# Patient Record
Sex: FEMALE | Race: BLACK OR AFRICAN AMERICAN | NOT HISPANIC OR LATINO | Employment: OTHER | URBAN - METROPOLITAN AREA
[De-identification: names, ages, dates, MRNs, and addresses within clinical notes are randomized per-mention and may not be internally consistent; named-entity substitution may affect disease eponyms.]

---

## 2017-02-08 ENCOUNTER — LAB VISIT (OUTPATIENT)
Dept: LAB | Facility: HOSPITAL | Age: 77
End: 2017-02-08
Attending: INTERNAL MEDICINE
Payer: COMMERCIAL

## 2017-02-08 DIAGNOSIS — N39.0 URINARY TRACT INFECTION, SITE NOT SPECIFIED: Primary | ICD-10-CM

## 2017-02-08 LAB
ALBUMIN SERPL BCP-MCNC: 2.7 G/DL
ALP SERPL-CCNC: 90 U/L
ALT SERPL W/O P-5'-P-CCNC: 11 U/L
ANION GAP SERPL CALC-SCNC: 13 MMOL/L
AST SERPL-CCNC: 14 U/L
BACTERIA #/AREA URNS AUTO: ABNORMAL /HPF
BASOPHILS # BLD AUTO: 0.01 K/UL
BASOPHILS NFR BLD: 0.1 %
BILIRUB SERPL-MCNC: 0.4 MG/DL
BILIRUB UR QL STRIP: NEGATIVE
BUN SERPL-MCNC: 68 MG/DL
CALCIUM SERPL-MCNC: 8.7 MG/DL
CHLORIDE SERPL-SCNC: 104 MMOL/L
CLARITY UR REFRACT.AUTO: ABNORMAL
CO2 SERPL-SCNC: 17 MMOL/L
COLOR UR AUTO: YELLOW
CREAT SERPL-MCNC: 3.9 MG/DL
DIFFERENTIAL METHOD: ABNORMAL
EOSINOPHIL # BLD AUTO: 0.1 K/UL
EOSINOPHIL NFR BLD: 0.8 %
ERYTHROCYTE [DISTWIDTH] IN BLOOD BY AUTOMATED COUNT: 16.9 %
EST. GFR  (AFRICAN AMERICAN): 12.1 ML/MIN/1.73 M^2
EST. GFR  (NON AFRICAN AMERICAN): 10.5 ML/MIN/1.73 M^2
GLUCOSE SERPL-MCNC: 146 MG/DL
GLUCOSE UR QL STRIP: NEGATIVE
HCT VFR BLD AUTO: 23 %
HGB BLD-MCNC: 7.3 G/DL
HGB UR QL STRIP: ABNORMAL
HYALINE CASTS UR QL AUTO: 0 /LPF
INR PPP: 1.4
KETONES UR QL STRIP: NEGATIVE
LEUKOCYTE ESTERASE UR QL STRIP: ABNORMAL
LYMPHOCYTES # BLD AUTO: 1 K/UL
LYMPHOCYTES NFR BLD: 9.8 %
MCH RBC QN AUTO: 25.8 PG
MCHC RBC AUTO-ENTMCNC: 31.7 %
MCV RBC AUTO: 81 FL
MICROSCOPIC COMMENT: ABNORMAL
MONOCYTES # BLD AUTO: 1.5 K/UL
MONOCYTES NFR BLD: 14.5 %
NEUTROPHILS # BLD AUTO: 7.5 K/UL
NEUTROPHILS NFR BLD: 74.1 %
NITRITE UR QL STRIP: NEGATIVE
PH UR STRIP: 6 [PH] (ref 5–8)
PLATELET # BLD AUTO: 312 K/UL
PMV BLD AUTO: 10.2 FL
POTASSIUM SERPL-SCNC: 4.1 MMOL/L
PROT SERPL-MCNC: 6.3 G/DL
PROT UR QL STRIP: ABNORMAL
PROTHROMBIN TIME: 14.3 SEC
RBC # BLD AUTO: 2.83 M/UL
RBC #/AREA URNS AUTO: 3 /HPF (ref 0–4)
SODIUM SERPL-SCNC: 134 MMOL/L
SP GR UR STRIP: 1.01 (ref 1–1.03)
SQUAMOUS #/AREA URNS AUTO: 0 /HPF
URN SPEC COLLECT METH UR: ABNORMAL
UROBILINOGEN UR STRIP-ACNC: NEGATIVE EU/DL
WBC # BLD AUTO: 10.11 K/UL
WBC #/AREA URNS AUTO: >100 /HPF (ref 0–5)

## 2017-02-08 PROCEDURE — 85610 PROTHROMBIN TIME: CPT | Mod: PO

## 2017-02-08 PROCEDURE — 87088 URINE BACTERIA CULTURE: CPT

## 2017-02-08 PROCEDURE — 87077 CULTURE AEROBIC IDENTIFY: CPT

## 2017-02-08 PROCEDURE — 81000 URINALYSIS NONAUTO W/SCOPE: CPT | Mod: PO

## 2017-02-08 PROCEDURE — 87186 SC STD MICRODIL/AGAR DIL: CPT

## 2017-02-08 PROCEDURE — 85025 COMPLETE CBC W/AUTO DIFF WBC: CPT | Mod: PO

## 2017-02-08 PROCEDURE — 80053 COMPREHEN METABOLIC PANEL: CPT | Mod: PO

## 2017-02-08 PROCEDURE — 87086 URINE CULTURE/COLONY COUNT: CPT

## 2017-02-11 LAB — BACTERIA UR CULT: NORMAL

## 2017-03-20 ENCOUNTER — LAB VISIT (OUTPATIENT)
Dept: LAB | Facility: HOSPITAL | Age: 77
End: 2017-03-20
Attending: INTERNAL MEDICINE
Payer: COMMERCIAL

## 2017-03-20 DIAGNOSIS — Z79.01 LONG TERM (CURRENT) USE OF ANTICOAGULANTS: Primary | ICD-10-CM

## 2017-03-20 LAB
INR PPP: 6.7
PROTHROMBIN TIME: 65.1 SEC

## 2017-03-20 PROCEDURE — 85610 PROTHROMBIN TIME: CPT | Mod: PO

## 2017-05-16 ENCOUNTER — LAB VISIT (OUTPATIENT)
Dept: LAB | Facility: HOSPITAL | Age: 77
End: 2017-05-16
Attending: INTERNAL MEDICINE
Payer: COMMERCIAL

## 2017-05-16 DIAGNOSIS — N18.30 CHRONIC KIDNEY DISEASE, STAGE III (MODERATE): ICD-10-CM

## 2017-05-16 DIAGNOSIS — I50.21 ACUTE SYSTOLIC HEART FAILURE: Primary | ICD-10-CM

## 2017-05-16 LAB
ALBUMIN SERPL BCP-MCNC: 3.8 G/DL
ALP SERPL-CCNC: 101 U/L
ALT SERPL W/O P-5'-P-CCNC: 23 U/L
ANION GAP SERPL CALC-SCNC: 8 MMOL/L
ANISOCYTOSIS BLD QL SMEAR: SLIGHT
AST SERPL-CCNC: 16 U/L
BASOPHILS # BLD AUTO: 0.02 K/UL
BASOPHILS NFR BLD: 0.3 %
BILIRUB SERPL-MCNC: 0.4 MG/DL
BUN SERPL-MCNC: 25 MG/DL
CALCIUM SERPL-MCNC: 10.3 MG/DL
CHLORIDE SERPL-SCNC: 113 MMOL/L
CO2 SERPL-SCNC: 21 MMOL/L
CREAT SERPL-MCNC: 1.7 MG/DL
DACRYOCYTES BLD QL SMEAR: ABNORMAL
DIFFERENTIAL METHOD: ABNORMAL
EOSINOPHIL # BLD AUTO: 0.1 K/UL
EOSINOPHIL NFR BLD: 1.4 %
ERYTHROCYTE [DISTWIDTH] IN BLOOD BY AUTOMATED COUNT: 18.3 %
EST. GFR  (AFRICAN AMERICAN): 33.1 ML/MIN/1.73 M^2
EST. GFR  (NON AFRICAN AMERICAN): 28.7 ML/MIN/1.73 M^2
GLUCOSE SERPL-MCNC: 97 MG/DL
HCT VFR BLD AUTO: 30.3 %
HGB BLD-MCNC: 9.1 G/DL
LYMPHOCYTES # BLD AUTO: 1.5 K/UL
LYMPHOCYTES NFR BLD: 23.4 %
MCH RBC QN AUTO: 25 PG
MCHC RBC AUTO-ENTMCNC: 30 %
MCV RBC AUTO: 83 FL
MONOCYTES # BLD AUTO: 0.6 K/UL
MONOCYTES NFR BLD: 8.6 %
NEUTROPHILS # BLD AUTO: 4.3 K/UL
NEUTROPHILS NFR BLD: 66.3 %
OVALOCYTES BLD QL SMEAR: ABNORMAL
PLATELET # BLD AUTO: 350 K/UL
PMV BLD AUTO: 10.3 FL
POIKILOCYTOSIS BLD QL SMEAR: ABNORMAL
POLYCHROMASIA BLD QL SMEAR: ABNORMAL
POTASSIUM SERPL-SCNC: 4.7 MMOL/L
PROT SERPL-MCNC: 7.1 G/DL
RBC # BLD AUTO: 3.64 M/UL
SCHISTOCYTES BLD QL SMEAR: PRESENT
SODIUM SERPL-SCNC: 142 MMOL/L
SPHEROCYTES BLD QL SMEAR: ABNORMAL
WBC # BLD AUTO: 6.54 K/UL

## 2017-05-16 PROCEDURE — 80053 COMPREHEN METABOLIC PANEL: CPT | Mod: PO

## 2017-05-16 PROCEDURE — 85025 COMPLETE CBC W/AUTO DIFF WBC: CPT | Mod: PO

## 2017-06-21 ENCOUNTER — HOSPITAL ENCOUNTER (EMERGENCY)
Facility: HOSPITAL | Age: 77
Discharge: HOME | End: 2017-06-21
Attending: EMERGENCY MEDICINE
Payer: COMMERCIAL

## 2017-06-21 VITALS
RESPIRATION RATE: 18 BRPM | DIASTOLIC BLOOD PRESSURE: 49 MMHG | SYSTOLIC BLOOD PRESSURE: 111 MMHG | HEART RATE: 53 BPM | BODY MASS INDEX: 18.02 KG/M2 | TEMPERATURE: 98 F | WEIGHT: 105 LBS | OXYGEN SATURATION: 100 %

## 2017-06-21 DIAGNOSIS — N39.0 URINARY TRACT INFECTION WITHOUT HEMATURIA, SITE UNSPECIFIED: ICD-10-CM

## 2017-06-21 DIAGNOSIS — R53.1 WEAKNESS: Primary | ICD-10-CM

## 2017-06-21 LAB
ALBUMIN SERPL BCP-MCNC: 3.6 G/DL
ALP SERPL-CCNC: 73 U/L
ALT SERPL W/O P-5'-P-CCNC: 16 U/L
ANION GAP SERPL CALC-SCNC: 11 MMOL/L
AST SERPL-CCNC: 17 U/L
BACTERIA #/AREA URNS AUTO: ABNORMAL /HPF
BASOPHILS # BLD AUTO: 0.02 K/UL
BASOPHILS NFR BLD: 0.3 %
BILIRUB SERPL-MCNC: 0.3 MG/DL
BILIRUB UR QL STRIP: NEGATIVE
BNP SERPL-MCNC: 852 PG/ML
BUN SERPL-MCNC: 33 MG/DL
CALCIUM SERPL-MCNC: 10.3 MG/DL
CHLORIDE SERPL-SCNC: 109 MMOL/L
CK SERPL-CCNC: 24 U/L
CLARITY UR REFRACT.AUTO: ABNORMAL
CO2 SERPL-SCNC: 18 MMOL/L
COLOR UR AUTO: YELLOW
CREAT SERPL-MCNC: 2.3 MG/DL
DIFFERENTIAL METHOD: ABNORMAL
EOSINOPHIL # BLD AUTO: 0.1 K/UL
EOSINOPHIL NFR BLD: 1.2 %
ERYTHROCYTE [DISTWIDTH] IN BLOOD BY AUTOMATED COUNT: 17.4 %
EST. GFR  (AFRICAN AMERICAN): 22.9 ML/MIN/1.73 M^2
EST. GFR  (NON AFRICAN AMERICAN): 19.9 ML/MIN/1.73 M^2
GLUCOSE SERPL-MCNC: 125 MG/DL
GLUCOSE UR QL STRIP: NEGATIVE
HCT VFR BLD AUTO: 31.3 %
HGB BLD-MCNC: 9.5 G/DL
HGB UR QL STRIP: ABNORMAL
KETONES UR QL STRIP: NEGATIVE
LEUKOCYTE ESTERASE UR QL STRIP: ABNORMAL
LYMPHOCYTES # BLD AUTO: 1.6 K/UL
LYMPHOCYTES NFR BLD: 23.8 %
MCH RBC QN AUTO: 25.6 PG
MCHC RBC AUTO-ENTMCNC: 30.4 %
MCV RBC AUTO: 84 FL
MICROSCOPIC COMMENT: ABNORMAL
MONOCYTES # BLD AUTO: 0.7 K/UL
MONOCYTES NFR BLD: 10.4 %
NEUTROPHILS # BLD AUTO: 4.2 K/UL
NEUTROPHILS NFR BLD: 63.4 %
NITRITE UR QL STRIP: NEGATIVE
PH UR STRIP: 5 [PH] (ref 5–8)
PLATELET # BLD AUTO: 378 K/UL
PMV BLD AUTO: 10.1 FL
POTASSIUM SERPL-SCNC: 4.8 MMOL/L
PROT SERPL-MCNC: 7.2 G/DL
PROT UR QL STRIP: ABNORMAL
RBC # BLD AUTO: 3.71 M/UL
RBC #/AREA URNS AUTO: 3 /HPF (ref 0–4)
SODIUM SERPL-SCNC: 138 MMOL/L
SP GR UR STRIP: 1.02 (ref 1–1.03)
SQUAMOUS #/AREA URNS AUTO: 10 /HPF
TROPONIN I SERPL DL<=0.01 NG/ML-MCNC: 0.03 NG/ML
URN SPEC COLLECT METH UR: ABNORMAL
UROBILINOGEN UR STRIP-ACNC: NEGATIVE EU/DL
WBC # BLD AUTO: 6.65 K/UL
WBC #/AREA URNS AUTO: >100 /HPF (ref 0–5)
WBC CLUMPS UR QL AUTO: ABNORMAL

## 2017-06-21 PROCEDURE — 82550 ASSAY OF CK (CPK): CPT

## 2017-06-21 PROCEDURE — 81000 URINALYSIS NONAUTO W/SCOPE: CPT

## 2017-06-21 PROCEDURE — 93005 ELECTROCARDIOGRAM TRACING: CPT | Performed by: GENERAL PRACTICE

## 2017-06-21 PROCEDURE — 85025 COMPLETE CBC W/AUTO DIFF WBC: CPT

## 2017-06-21 PROCEDURE — 36000 PLACE NEEDLE IN VEIN: CPT

## 2017-06-21 PROCEDURE — 93010 ELECTROCARDIOGRAM REPORT: CPT | Mod: S$GLB,,, | Performed by: INTERNAL MEDICINE

## 2017-06-21 PROCEDURE — 83880 ASSAY OF NATRIURETIC PEPTIDE: CPT

## 2017-06-21 PROCEDURE — 84484 ASSAY OF TROPONIN QUANT: CPT

## 2017-06-21 PROCEDURE — 99900035 HC TECH TIME PER 15 MIN (STAT): Performed by: GENERAL PRACTICE

## 2017-06-21 PROCEDURE — 99284 EMERGENCY DEPT VISIT MOD MDM: CPT | Mod: 25

## 2017-06-21 PROCEDURE — 80053 COMPREHEN METABOLIC PANEL: CPT

## 2017-06-21 RX ORDER — CEFUROXIME AXETIL 500 MG/1
500 TABLET ORAL 2 TIMES DAILY
Qty: 20 TABLET | Refills: 0 | Status: SHIPPED | OUTPATIENT
Start: 2017-06-21 | End: 2017-07-01

## 2017-06-21 NOTE — ED NOTES
Pt c/o generalized weakness and difficulty walking since yesterday with lack of appetite and weightloss since May. Pt unsure of how much. Reports needing assistance with ambulation today and yesterday.      Level of Consciousness: Patient is awake, alert, oriented to person, place, time, and situation.    Appearance: Pt resting comfortably in stretcher, no acute distress at this time. Pt in hospital gown. Hygiene is appropriate.   Skin: Skin is warm, dry, and intact. Skin turgor is normal/elastic. Mucous membranes moist. Skin color is normal for ethnicity. No skin breakdown noted.  Musculoskeletal: Moves all extremities well. Full active ROM. No deformities noted. Generalized weakness.  Gait unobserved, pt arrived to exam room in wheelchair. Reports difficulty with ambulation and need for assistance at home.    Respiratory: Airway open and patent. Respirations equal and unlabored. Breath sounds clear to auscultation. Denies any SOB.   Cardiac: Bradycardia. No peripheral edema noted. Radial and pedal pulses present and normal. Capillary refill is within normal limits. Denies chest pain. Hx of AV shunt to bilateral upper arms, no bruit or thrill present in either.   GI: Abdomen soft, non-tender to all quadrants with palpitation. Bowel sounds present and active in all quads. Abdomen symmetric with no distention noted. Denies any N/V/D at this time. Pt reports nausea over the past 3 days, but none at this time. Reports blood in stool yesterday, no BM today.    Neurological: Symmetrical expressions noted to face. Equal bilateral . Normal sensation reported to all extremities. No obvious neurological deficits noted.   Psychosocial: Speech spontaneous, clear, and coherent. Appropriate to situation. Pt is calm and cooperative.     Pt informed of plan of care, verbalizes understanding, and denies any other questions, complaints, or concerns at this time. Bed in locked in lowest position, siderails up x2, call light  within reach. Pt continued on cardiac monitor, blood pressure cuff and continuous pulse ox in place. Will continue to monitor.

## 2017-06-21 NOTE — ED PROVIDER NOTES
"Encounter Date: 6/21/2017       History     Chief Complaint   Patient presents with    Palpitations     "heart racing since morning", weakness     Patient complaining of generalized weakness since yesterday.  Whenever she tries to walk she gets weak.  Denies dizziness, chest pain or shortness of breath.  Just generalized weakness.      The history is provided by the patient.   General Illness    The current episode started yesterday. The problem has been unchanged. Nothing relieves the symptoms. The symptoms are aggravated by activity. Pertinent negatives include no fever, no abdominal pain, no constipation, no diarrhea, no nausea, no vomiting, no congestion, no ear pain, no mouth sores, no sore throat, no swollen glands, no muscle aches, no cough, no shortness of breath and no rash. She has received no recent medical care.     Review of patient's allergies indicates:  No Known Allergies  Past Medical History:   Diagnosis Date    Diabetes mellitus     GERD (gastroesophageal reflux disease)     H/O kidney transplant     Hypertension     Renal disorder      Past Surgical History:   Procedure Laterality Date    DIALYSIS FISTULA CREATION Bilateral     KIDNEY TRANSPLANT Right      No family history on file.  Social History   Substance Use Topics    Smoking status: Never Smoker    Smokeless tobacco: Not on file    Alcohol use No     Review of Systems   Constitutional: Negative for fever.   HENT: Negative for congestion, ear pain, mouth sores and sore throat.    Respiratory: Negative for cough and shortness of breath.    Cardiovascular: Negative for chest pain.   Gastrointestinal: Negative for abdominal pain, constipation, diarrhea, nausea and vomiting.   Genitourinary: Negative for dysuria.   Musculoskeletal: Negative for back pain.   Skin: Negative for rash.   Neurological: Negative for weakness.   Hematological: Does not bruise/bleed easily.       Physical Exam     Initial Vitals [06/21/17 1536]   BP Pulse " Resp Temp SpO2   (!) 178/72 (!) 55 16 97.9 °F (36.6 °C) 100 %     Physical Exam    Nursing note and vitals reviewed.  Constitutional: She appears well-developed and well-nourished. No distress.   HENT:   Head: Normocephalic and atraumatic.   Mouth/Throat: Oropharynx is clear and moist.   Eyes: Conjunctivae and EOM are normal. Pupils are equal, round, and reactive to light.   Neck: Normal range of motion. Neck supple.   Cardiovascular: Regular rhythm and normal heart sounds. Bradycardia present.  Exam reveals no gallop and no friction rub.    No murmur heard.  Shunt to RUE with faint thrill   Pulmonary/Chest: Breath sounds normal. No respiratory distress. She has no wheezes. She has no rhonchi. She has no rales.   Abdominal: Soft. Bowel sounds are normal. She exhibits no distension and no mass. There is no tenderness. There is no rebound and no guarding.   Musculoskeletal: Normal range of motion. She exhibits no edema or tenderness.   Neurological: She is alert and oriented to person, place, and time. She has normal strength.   Skin: Skin is warm and dry. No rash noted.   Psychiatric: She has a normal mood and affect. Thought content normal.         ED Course   Procedures  Labs Reviewed   CBC W/ AUTO DIFFERENTIAL - Abnormal; Notable for the following:        Result Value    RBC 3.71 (*)     Hemoglobin 9.5 (*)     Hematocrit 31.3 (*)     MCH 25.6 (*)     MCHC 30.4 (*)     RDW 17.4 (*)     Platelets 378 (*)     All other components within normal limits   COMPREHENSIVE METABOLIC PANEL - Abnormal; Notable for the following:     CO2 18 (*)     Glucose 125 (*)     BUN, Bld 33 (*)     Creatinine 2.3 (*)     eGFR if  22.9 (*)     eGFR if non  19.9 (*)     All other components within normal limits   URINALYSIS - Abnormal; Notable for the following:     Appearance, UA Hazy (*)     Protein, UA Trace (*)     Occult Blood UA 1+ (*)     Leukocytes, UA 3+ (*)     All other components within normal  limits   B-TYPE NATRIURETIC PEPTIDE - Abnormal; Notable for the following:      (*)     All other components within normal limits   TROPONIN I - Abnormal; Notable for the following:     Troponin I 0.030 (*)     All other components within normal limits   URINALYSIS MICROSCOPIC - Abnormal; Notable for the following:     WBC, UA >100 (*)     WBC Clumps, UA Occasional (*)     Bacteria, UA Many (*)     All other components within normal limits   CK     EKG Readings: (Independently Interpreted)   Rhythm: Normal Sinus Rhythm. Heart Rate: 56. Ectopy: No Ectopy. Conduction: Normal. ST Segments: Normal ST Segments. T Waves: Normal. Clinical Impression: Normal Sinus Rhythm     Attending:   Physician Attestation Statement: I have reviewed this case with my non-physician provider.    ED Vital Signs:  Vitals:    06/21/17 1536 06/21/17 1545 06/21/17 1618 06/21/17 1619   BP: (!) 178/72  (!) 143/65 (!) 132/59   Pulse: (!) 55 (!) 55 (!) 53 (!) 54   Resp: 16      Temp: 97.9 °F (36.6 °C)      TempSrc: Oral      SpO2: 100%  100% 100%   Weight: 47.6 kg (105 lb)       06/21/17 1621   BP: (!) 110/48   Pulse: (!) 55   Resp:    Temp:    TempSrc:    SpO2: 100%   Weight:          Abnormal Lab Results:  Labs Reviewed   CBC W/ AUTO DIFFERENTIAL - Abnormal; Notable for the following:        Result Value    RBC 3.71 (*)     Hemoglobin 9.5 (*)     Hematocrit 31.3 (*)     MCH 25.6 (*)     MCHC 30.4 (*)     RDW 17.4 (*)     Platelets 378 (*)     All other components within normal limits   COMPREHENSIVE METABOLIC PANEL - Abnormal; Notable for the following:     CO2 18 (*)     Glucose 125 (*)     BUN, Bld 33 (*)     Creatinine 2.3 (*)     eGFR if  22.9 (*)     eGFR if non  19.9 (*)     All other components within normal limits   URINALYSIS - Abnormal; Notable for the following:     Appearance, UA Hazy (*)     Protein, UA Trace (*)     Occult Blood UA 1+ (*)     Leukocytes, UA 3+ (*)     All other components  within normal limits   B-TYPE NATRIURETIC PEPTIDE - Abnormal; Notable for the following:      (*)     All other components within normal limits   TROPONIN I - Abnormal; Notable for the following:     Troponin I 0.030 (*)     All other components within normal limits   URINALYSIS MICROSCOPIC - Abnormal; Notable for the following:     WBC, UA >100 (*)     WBC Clumps, UA Occasional (*)     Bacteria, UA Many (*)     All other components within normal limits   CK          All Lab Results:  Results for orders placed or performed during the hospital encounter of 06/21/17   CBC auto differential   Result Value Ref Range    WBC 6.65 3.90 - 12.70 K/uL    RBC 3.71 (L) 4.00 - 5.40 M/uL    Hemoglobin 9.5 (L) 12.0 - 16.0 g/dL    Hematocrit 31.3 (L) 37.0 - 48.5 %    MCV 84 82 - 98 fL    MCH 25.6 (L) 27.0 - 31.0 pg    MCHC 30.4 (L) 32.0 - 36.0 %    RDW 17.4 (H) 11.5 - 14.5 %    Platelets 378 (H) 150 - 350 K/uL    MPV 10.1 9.2 - 12.9 fL    Gran # 4.2 1.8 - 7.7 K/uL    Lymph # 1.6 1.0 - 4.8 K/uL    Mono # 0.7 0.3 - 1.0 K/uL    Eos # 0.1 0.0 - 0.5 K/uL    Baso # 0.02 0.00 - 0.20 K/uL    Gran% 63.4 38.0 - 73.0 %    Lymph% 23.8 18.0 - 48.0 %    Mono% 10.4 4.0 - 15.0 %    Eosinophil% 1.2 0.0 - 8.0 %    Basophil% 0.3 0.0 - 1.9 %    Differential Method Automated    Comprehensive metabolic panel   Result Value Ref Range    Sodium 138 136 - 145 mmol/L    Potassium 4.8 3.5 - 5.1 mmol/L    Chloride 109 95 - 110 mmol/L    CO2 18 (L) 23 - 29 mmol/L    Glucose 125 (H) 70 - 110 mg/dL    BUN, Bld 33 (H) 8 - 23 mg/dL    Creatinine 2.3 (H) 0.5 - 1.4 mg/dL    Calcium 10.3 8.7 - 10.5 mg/dL    Total Protein 7.2 6.0 - 8.4 g/dL    Albumin 3.6 3.5 - 5.2 g/dL    Total Bilirubin 0.3 0.1 - 1.0 mg/dL    Alkaline Phosphatase 73 55 - 135 U/L    AST 17 10 - 40 U/L    ALT 16 10 - 44 U/L    Anion Gap 11 8 - 16 mmol/L    eGFR if African American 22.9 (A) >60 mL/min/1.73 m^2    eGFR if non African American 19.9 (A) >60 mL/min/1.73 m^2   Urinalysis   Result  Value Ref Range    Specimen UA Urine, Clean Catch     Color, UA Yellow Yellow, Straw, Cornelia    Appearance, UA Hazy (A) Clear    pH, UA 5.0 5.0 - 8.0    Specific Gravity, UA 1.020 1.005 - 1.030    Protein, UA Trace (A) Negative    Glucose, UA Negative Negative    Ketones, UA Negative Negative    Bilirubin (UA) Negative Negative    Occult Blood UA 1+ (A) Negative    Nitrite, UA Negative Negative    Urobilinogen, UA Negative <2.0 EU/dL    Leukocytes, UA 3+ (A) Negative   Brain natriuretic peptide   Result Value Ref Range     (H) 0 - 99 pg/mL   CK   Result Value Ref Range    CPK 24 20 - 180 U/L   Troponin I   Result Value Ref Range    Troponin I 0.030 (H) 0.000 - 0.026 ng/mL   Urinalysis Microscopic   Result Value Ref Range    RBC, UA 3 0 - 4 /hpf    WBC, UA >100 (H) 0 - 5 /hpf    WBC Clumps, UA Occasional (A) None-Rare    Bacteria, UA Many (A) None-Occ /hpf    Squam Epithel, UA 10 /hpf    Microscopic Comment SEE COMMENT            Imaging Results:  Imaging Results          X-Ray Chest AP Portable (Final result)  Result time 06/21/17 16:15:43    Final result by Carolyn Garcia III, MD (06/21/17 16:15:43)                 Impression:     Stable cardiomegaly without evidence of pulmonary edema. Band-shaped opacity projecting over the retrocardiac left lower lobe possibly representing summation artifact or infiltrate/atelectasis.         Electronically signed by: CAROLYN GARCIA MD  Date:     06/21/17  Time:    16:15              Narrative:    XR CHEST AP PORTABLE    Clinical history: weakness.      Findings: There is stable cardiomegaly and aortic atherosclerosis. The left lower lobe is poorly evaluated due to overlying cardiomegaly and suboptimal penetration.  There is a band-shaped opacity projecting over the retrocardiac left lower lobe possibly representing summation artifact or infiltrate/atelectasis.  The remainder of the lungs appear grossly clear. There is no evidence of pulmonary edema, pleural effusion, or  pneumothorax.                                 The Emergency Provider reviewed the vital signs and test results, which are outlined above.    ED Discussions:  5:08 PM: Reassessed pt at this time.  Pt states her condition has improved at this time.  Patient denies any weakness or dizziness during orthostatics, states she feels much better, and is ready to go home.  Discussed all blood work line by line, and went over how it is mostly improved over patient's baseline.  Patient states she will follow up with her PCP tomorrow.  Discussed with pt all pertinent ED information and results. Discussed pt dx of weakness and UTI and plan of tx. Gave pt all f/u and return to the ED instructions. All questions and concerns were addressed at this time. Pt expresses understanding of information and instructions, and is comfortable with plan to discharge. Pt is stable for discharge.                                 ED Course     Clinical Impression:       ICD-10-CM ICD-9-CM   1. Weakness R53.1 780.79   2. Urinary tract infection without hematuria, site unspecified N39.0 599.0                                Bebeto Zepeda MD  06/21/17 4824

## 2017-06-23 ENCOUNTER — LAB VISIT (OUTPATIENT)
Dept: LAB | Facility: HOSPITAL | Age: 77
End: 2017-06-23
Attending: INTERNAL MEDICINE
Payer: COMMERCIAL

## 2017-06-23 DIAGNOSIS — I13.0 HYPERTENSIVE HEART AND RENAL DISEASE WITH CONGESTIVE HEART FAILURE: Primary | ICD-10-CM

## 2017-06-23 LAB
ALBUMIN SERPL BCP-MCNC: 3.3 G/DL
ALP SERPL-CCNC: 78 U/L
ALT SERPL W/O P-5'-P-CCNC: 19 U/L
ANION GAP SERPL CALC-SCNC: 11 MMOL/L
AST SERPL-CCNC: 18 U/L
BASOPHILS # BLD AUTO: 0.02 K/UL
BASOPHILS NFR BLD: 0.3 %
BILIRUB SERPL-MCNC: 0.3 MG/DL
BUN SERPL-MCNC: 43 MG/DL
CALCIUM SERPL-MCNC: 9.7 MG/DL
CHLORIDE SERPL-SCNC: 109 MMOL/L
CO2 SERPL-SCNC: 20 MMOL/L
CREAT SERPL-MCNC: 2.4 MG/DL
DIFFERENTIAL METHOD: ABNORMAL
EOSINOPHIL # BLD AUTO: 0 K/UL
EOSINOPHIL NFR BLD: 0.6 %
ERYTHROCYTE [DISTWIDTH] IN BLOOD BY AUTOMATED COUNT: 17.7 %
EST. GFR  (AFRICAN AMERICAN): 21.8 ML/MIN/1.73 M^2
EST. GFR  (NON AFRICAN AMERICAN): 18.9 ML/MIN/1.73 M^2
GLUCOSE SERPL-MCNC: 90 MG/DL
HCT VFR BLD AUTO: 29.3 %
HGB BLD-MCNC: 8.6 G/DL
LYMPHOCYTES # BLD AUTO: 1.5 K/UL
LYMPHOCYTES NFR BLD: 22.6 %
MCH RBC QN AUTO: 25 PG
MCHC RBC AUTO-ENTMCNC: 29.4 %
MCV RBC AUTO: 85 FL
MONOCYTES # BLD AUTO: 0.6 K/UL
MONOCYTES NFR BLD: 9.1 %
NEUTROPHILS # BLD AUTO: 4.3 K/UL
NEUTROPHILS NFR BLD: 66.9 %
PLATELET # BLD AUTO: 362 K/UL
PMV BLD AUTO: 10.9 FL
POTASSIUM SERPL-SCNC: 5 MMOL/L
PROT SERPL-MCNC: 6.2 G/DL
RBC # BLD AUTO: 3.44 M/UL
SODIUM SERPL-SCNC: 140 MMOL/L
WBC # BLD AUTO: 6.46 K/UL

## 2017-06-23 PROCEDURE — 85025 COMPLETE CBC W/AUTO DIFF WBC: CPT | Mod: PO

## 2017-06-23 PROCEDURE — 80053 COMPREHEN METABOLIC PANEL: CPT | Mod: PO

## 2017-07-01 ENCOUNTER — HOSPITAL ENCOUNTER (EMERGENCY)
Facility: HOSPITAL | Age: 77
Discharge: ANOTHER HOSPITAL | End: 2017-07-02
Attending: EMERGENCY MEDICINE
Payer: COMMERCIAL

## 2017-07-01 DIAGNOSIS — N39.0 URINARY TRACT INFECTION WITHOUT HEMATURIA, SITE UNSPECIFIED: ICD-10-CM

## 2017-07-01 DIAGNOSIS — N17.9 ACUTE-ON-CHRONIC RENAL FAILURE: Primary | ICD-10-CM

## 2017-07-01 DIAGNOSIS — N18.9 ACUTE-ON-CHRONIC RENAL FAILURE: Primary | ICD-10-CM

## 2017-07-01 DIAGNOSIS — E11.22 TYPE 2 DIABETES MELLITUS WITH CHRONIC KIDNEY DISEASE, WITHOUT LONG-TERM CURRENT USE OF INSULIN, UNSPECIFIED CKD STAGE: ICD-10-CM

## 2017-07-01 DIAGNOSIS — Z91.199 HISTORY OF NONCOMPLIANCE WITH MEDICAL TREATMENT: ICD-10-CM

## 2017-07-01 DIAGNOSIS — I10 POORLY-CONTROLLED HYPERTENSION: ICD-10-CM

## 2017-07-01 DIAGNOSIS — Z94.0 KIDNEY TRANSPLANT STATUS, CADAVERIC: ICD-10-CM

## 2017-07-01 LAB
ALBUMIN SERPL BCP-MCNC: 3.7 G/DL
ALP SERPL-CCNC: 85 U/L
ALT SERPL W/O P-5'-P-CCNC: 60 U/L
AMYLASE SERPL-CCNC: 44 U/L
ANION GAP SERPL CALC-SCNC: 17 MMOL/L
AST SERPL-CCNC: 53 U/L
BACTERIA #/AREA URNS AUTO: ABNORMAL /HPF
BASOPHILS # BLD AUTO: 0.02 K/UL
BASOPHILS NFR BLD: 0.3 %
BILIRUB SERPL-MCNC: 0.4 MG/DL
BILIRUB UR QL STRIP: ABNORMAL
BUN SERPL-MCNC: 65 MG/DL
CALCIUM SERPL-MCNC: 10.5 MG/DL
CHLORIDE SERPL-SCNC: 106 MMOL/L
CLARITY UR REFRACT.AUTO: ABNORMAL
CO2 SERPL-SCNC: 17 MMOL/L
COLOR UR AUTO: YELLOW
CREAT SERPL-MCNC: 4.6 MG/DL
DIFFERENTIAL METHOD: ABNORMAL
EOSINOPHIL # BLD AUTO: 0.1 K/UL
EOSINOPHIL NFR BLD: 0.7 %
ERYTHROCYTE [DISTWIDTH] IN BLOOD BY AUTOMATED COUNT: 17.6 %
EST. GFR  (AFRICAN AMERICAN): 9.9 ML/MIN/1.73 M^2
EST. GFR  (NON AFRICAN AMERICAN): 8.6 ML/MIN/1.73 M^2
GLUCOSE SERPL-MCNC: 88 MG/DL
GLUCOSE UR QL STRIP: NEGATIVE
HCT VFR BLD AUTO: 30.3 %
HGB BLD-MCNC: 9.4 G/DL
HGB UR QL STRIP: ABNORMAL
HYALINE CASTS UR QL AUTO: 0 /LPF
KETONES UR QL STRIP: ABNORMAL
LEUKOCYTE ESTERASE UR QL STRIP: ABNORMAL
LIPASE SERPL-CCNC: 6 U/L
LYMPHOCYTES # BLD AUTO: 1.3 K/UL
LYMPHOCYTES NFR BLD: 18.1 %
MCH RBC QN AUTO: 25.9 PG
MCHC RBC AUTO-ENTMCNC: 31 %
MCV RBC AUTO: 84 FL
MICROSCOPIC COMMENT: ABNORMAL
MONOCYTES # BLD AUTO: 0.7 K/UL
MONOCYTES NFR BLD: 9.6 %
NEUTROPHILS # BLD AUTO: 4.9 K/UL
NEUTROPHILS NFR BLD: 71.2 %
NITRITE UR QL STRIP: NEGATIVE
NON-SQ EPI CELLS #/AREA URNS AUTO: 4 /HPF
PH UR STRIP: 6 [PH] (ref 5–8)
PLATELET # BLD AUTO: 331 K/UL
PMV BLD AUTO: 11.1 FL
POTASSIUM SERPL-SCNC: 5.1 MMOL/L
PROT SERPL-MCNC: 7.5 G/DL
PROT UR QL STRIP: ABNORMAL
RBC # BLD AUTO: 3.63 M/UL
RBC #/AREA URNS AUTO: 3 /HPF (ref 0–4)
SODIUM SERPL-SCNC: 140 MMOL/L
SP GR UR STRIP: 1.02 (ref 1–1.03)
SQUAMOUS #/AREA URNS AUTO: 10 /HPF
URN SPEC COLLECT METH UR: ABNORMAL
UROBILINOGEN UR STRIP-ACNC: NEGATIVE EU/DL
WBC # BLD AUTO: 6.9 K/UL
WBC #/AREA URNS AUTO: >100 /HPF (ref 0–5)
WBC CLUMPS UR QL AUTO: ABNORMAL

## 2017-07-01 PROCEDURE — 85025 COMPLETE CBC W/AUTO DIFF WBC: CPT

## 2017-07-01 PROCEDURE — 83690 ASSAY OF LIPASE: CPT

## 2017-07-01 PROCEDURE — 63600175 PHARM REV CODE 636 W HCPCS: Performed by: EMERGENCY MEDICINE

## 2017-07-01 PROCEDURE — 80053 COMPREHEN METABOLIC PANEL: CPT

## 2017-07-01 PROCEDURE — 96375 TX/PRO/DX INJ NEW DRUG ADDON: CPT

## 2017-07-01 PROCEDURE — 82150 ASSAY OF AMYLASE: CPT

## 2017-07-01 PROCEDURE — 99285 EMERGENCY DEPT VISIT HI MDM: CPT | Mod: 25

## 2017-07-01 PROCEDURE — 25000003 PHARM REV CODE 250: Performed by: EMERGENCY MEDICINE

## 2017-07-01 PROCEDURE — 96361 HYDRATE IV INFUSION ADD-ON: CPT | Mod: XS

## 2017-07-01 PROCEDURE — 96365 THER/PROPH/DIAG IV INF INIT: CPT

## 2017-07-01 PROCEDURE — 81000 URINALYSIS NONAUTO W/SCOPE: CPT

## 2017-07-01 RX ORDER — AMIODARONE HYDROCHLORIDE 200 MG/1
TABLET ORAL DAILY
COMMUNITY

## 2017-07-01 RX ORDER — TRAMADOL HYDROCHLORIDE 50 MG/1
50 TABLET ORAL EVERY 6 HOURS PRN
COMMUNITY

## 2017-07-01 RX ORDER — FERROUS GLUCONATE 324(38)MG
324 TABLET ORAL DAILY
COMMUNITY

## 2017-07-01 RX ORDER — ONDANSETRON 2 MG/ML
4 INJECTION INTRAMUSCULAR; INTRAVENOUS ONCE
Status: COMPLETED | OUTPATIENT
Start: 2017-07-01 | End: 2017-07-01

## 2017-07-01 RX ORDER — LABETALOL HYDROCHLORIDE 5 MG/ML
20 INJECTION, SOLUTION INTRAVENOUS
Status: COMPLETED | OUTPATIENT
Start: 2017-07-01 | End: 2017-07-01

## 2017-07-01 RX ORDER — BUMETANIDE 0.5 MG/1
0.5 TABLET ORAL DAILY
COMMUNITY
Start: 2017-06-21 | End: 2017-07-20

## 2017-07-01 RX ADMIN — CEFTRIAXONE 1 G: 1 INJECTION, SOLUTION INTRAVENOUS at 09:07

## 2017-07-01 RX ADMIN — LABETALOL HYDROCHLORIDE 20 MG: 5 INJECTION, SOLUTION INTRAVENOUS at 10:07

## 2017-07-01 RX ADMIN — SODIUM CHLORIDE 1000 ML: 0.9 INJECTION, SOLUTION INTRAVENOUS at 09:07

## 2017-07-01 RX ADMIN — ONDANSETRON 4 MG: 2 INJECTION INTRAMUSCULAR; INTRAVENOUS at 08:07

## 2017-07-01 RX ADMIN — SODIUM CHLORIDE 1000 ML: 0.9 INJECTION, SOLUTION INTRAVENOUS at 11:07

## 2017-07-02 VITALS
BODY MASS INDEX: 18.4 KG/M2 | OXYGEN SATURATION: 98 % | WEIGHT: 100 LBS | SYSTOLIC BLOOD PRESSURE: 172 MMHG | HEIGHT: 62 IN | HEART RATE: 75 BPM | DIASTOLIC BLOOD PRESSURE: 76 MMHG | RESPIRATION RATE: 18 BRPM | TEMPERATURE: 98 F

## 2017-07-02 NOTE — ED NOTES
Spoke with Jeevan, , at [facility], regarding pt request to transfer to [facility] and nephrologist Nolberto.  Per Jeevan, Dr. Montesinos is accepting.

## 2017-07-02 NOTE — ED NOTES
Pt reports that she is no longer nauseous after zofran was given. Pt is aaoX4, resp e/u, nad. Md is aware of pt's current VS. Bed locked in lowest position, side rails up X2, call bell in reach, family members at the bedside. Will continue to monitor.

## 2017-07-02 NOTE — ED NOTES
Dr. Up is at pt's bedside updating her and family member on test results and poc. Dr. Up informed pt and family members that he recommends admission. Pt verbalized wanting to be transferred to [facility] for admission.

## 2017-07-02 NOTE — ED PROVIDER NOTES
Encounter Date: 7/1/2017       History     Chief Complaint   Patient presents with    General Illness     pt reports generalized fatigue, nausea, emesis x 3 days and states that she hasn't been having an appetite and has been unable to take any of her medications (including antivirals for kidney transplant in 2005)      Patient currently presents with concern regarding nausea and vomiting.  She notes this onset about 3 days ago.  Patient has not been able to take her medications for several days including her immunosuppressants that she takes for management of her kidney transplant.  Patient denies any fever or chills and has not appreciated any urinary symptoms.  She denies abdominal pain, cough, shortness of breath.      Review of patient's allergies indicates:  No Known Allergies  Past Medical History:   Diagnosis Date    Diabetes mellitus     GERD (gastroesophageal reflux disease)     H/O kidney transplant     Hypertension     Renal disorder      Past Surgical History:   Procedure Laterality Date    DIALYSIS FISTULA CREATION Bilateral     KIDNEY TRANSPLANT Right      History reviewed. No pertinent family history.  Social History   Substance Use Topics    Smoking status: Former Smoker     Types: Cigarettes     Quit date: 2007    Smokeless tobacco: Never Used    Alcohol use No     Review of Systems   Constitutional: Negative for chills and fever.   HENT: Negative for congestion and rhinorrhea.    Respiratory: Negative for cough, chest tightness, shortness of breath and wheezing.    Cardiovascular: Negative for chest pain, palpitations and leg swelling.   Gastrointestinal: Positive for nausea and vomiting. Negative for abdominal pain, constipation and diarrhea.   Genitourinary: Negative for dysuria, frequency, urgency, vaginal bleeding and vaginal discharge.   Skin: Negative for color change and rash.   Allergic/Immunologic: Negative for immunocompromised state.   Neurological: Negative for dizziness,  weakness and numbness.   Hematological: Negative for adenopathy. Does not bruise/bleed easily.   All other systems reviewed and are negative.      Physical Exam     Initial Vitals [07/01/17 1916]   BP Pulse Resp Temp SpO2   (!) 224/91 64 16 98.5 °F (36.9 °C) 96 %      MAP       135.33         Physical Exam    Nursing note and vitals reviewed.  Constitutional: She appears well-developed and well-nourished. She is not diaphoretic. No distress.   HENT:   Head: Normocephalic and atraumatic.   Right Ear: External ear normal.   Left Ear: External ear normal.   Nose: Nose normal.   Mouth/Throat: Oropharynx is clear and moist.   Eyes: Conjunctivae and EOM are normal. Pupils are equal, round, and reactive to light. No scleral icterus.   Neck: Neck supple. No JVD present.   Cardiovascular: Normal rate, regular rhythm, normal heart sounds and intact distal pulses. Exam reveals no gallop and no friction rub.    No murmur heard.  Pulmonary/Chest: Breath sounds normal. She has no wheezes. She has no rhonchi. She has no rales.   Abdominal: Soft. Bowel sounds are normal. She exhibits no distension. There is no tenderness.   Kidney palpable in RLQ   Musculoskeletal: Normal range of motion.   Neurological: She is alert and oriented to person, place, and time. She has normal strength. No cranial nerve deficit or sensory deficit.   Skin: Skin is warm and dry. No rash noted.   Psychiatric: She has a normal mood and affect. Her behavior is normal.         ED Course   Procedures  Labs Reviewed   CBC W/ AUTO DIFFERENTIAL - Abnormal; Notable for the following:        Result Value    RBC 3.63 (*)     Hemoglobin 9.4 (*)     Hematocrit 30.3 (*)     MCH 25.9 (*)     MCHC 31.0 (*)     RDW 17.6 (*)     All other components within normal limits   URINALYSIS - Abnormal; Notable for the following:     Appearance, UA Cloudy (*)     Protein, UA 2+ (*)     Ketones, UA 1+ (*)     Bilirubin (UA) 1+ (*)     Occult Blood UA 3+ (*)     Leukocytes, UA 3+ (*)      All other components within normal limits   COMPREHENSIVE METABOLIC PANEL - Abnormal; Notable for the following:     CO2 17 (*)     BUN, Bld 65 (*)     Creatinine 4.6 (*)     AST 53 (*)     ALT 60 (*)     Anion Gap 17 (*)     eGFR if  9.9 (*)     eGFR if non  8.6 (*)     All other components within normal limits   URINALYSIS MICROSCOPIC - Abnormal; Notable for the following:     WBC, UA >100 (*)     WBC Clumps, UA Occasional (*)     Bacteria, UA Many (*)     Non-Squam Epith 4 (*)     All other components within normal limits   AMYLASE   LIPASE             Medical Decision Making:   ED Management:  I discussed the details of the patient encounter with Dr. Arvizu, the patient's nephrologist.  He agrees that the patient would best be suited with admission for IV fluids and nephrology consultation.  He does note that the patient has demonstrated a chronically intermittent history of medication noncompliance and failure to follow-up.  All historical, clinical, radiographic, and laboratory findings were reviewed with the patient/family in detail along with the indications for transfer to an outside facility (rather than admission to our facility in [place]) secondary to patient preference for continuity of care and a need for IVF and nephrology consultation given the diagnosis of UTI, nausea and vomiting, and RAF on CRI with a transplanted kidney.  All remaining questions and concerns were addressed at that time and the patient/family communicates understanding and agrees to proceed accordingly.  Similarly all pertinent details of the encounter were discussed with Dr Montesinos at [facility] ED via [facility] who agrees to accept the patient in transfer based on the needs/patient preferences outlined above.  Patient will be transferred by [facility] ambulance services secondary to a need for ongoing IVF en route.  Jose Roberto Up MD  10:52 PM                     ED Course     Clinical  Impression:   The primary encounter diagnosis was Acute-on-chronic renal failure. Diagnoses of Kidney transplant status, cadaveric, Poorly-controlled hypertension, History of noncompliance with medical treatment, Type 2 diabetes mellitus with chronic kidney disease, without long-term current use of insulin, unspecified CKD stage, and Urinary tract infection without hematuria, site unspecified were also pertinent to this visit.                           Jose Roberto Up MD  07/01/17 1913

## 2017-07-15 ENCOUNTER — HOSPITAL ENCOUNTER (EMERGENCY)
Facility: HOSPITAL | Age: 77
Discharge: ANOTHER HOSPITAL | End: 2017-07-16
Attending: EMERGENCY MEDICINE
Payer: COMMERCIAL

## 2017-07-15 ENCOUNTER — HOSPITAL ENCOUNTER (EMERGENCY)
Facility: HOSPITAL | Age: 77
Discharge: HOME | End: 2017-07-15
Attending: INTERNAL MEDICINE
Payer: COMMERCIAL

## 2017-07-15 VITALS
BODY MASS INDEX: 16.66 KG/M2 | DIASTOLIC BLOOD PRESSURE: 74 MMHG | WEIGHT: 100 LBS | TEMPERATURE: 98 F | HEIGHT: 65 IN | OXYGEN SATURATION: 97 % | SYSTOLIC BLOOD PRESSURE: 180 MMHG | RESPIRATION RATE: 18 BRPM | HEART RATE: 63 BPM

## 2017-07-15 DIAGNOSIS — R53.1 WEAKNESS: ICD-10-CM

## 2017-07-15 DIAGNOSIS — D63.1 ANEMIA DUE TO CHRONIC KIDNEY DISEASE: ICD-10-CM

## 2017-07-15 DIAGNOSIS — J96.90 RESPIRATORY FAILURE: ICD-10-CM

## 2017-07-15 DIAGNOSIS — N18.4 STAGE 4 CHRONIC KIDNEY DISEASE: ICD-10-CM

## 2017-07-15 DIAGNOSIS — N18.9 ANEMIA DUE TO CHRONIC KIDNEY DISEASE: ICD-10-CM

## 2017-07-15 DIAGNOSIS — I46.9 CARDIAC ARREST: ICD-10-CM

## 2017-07-15 DIAGNOSIS — R41.82 ALTERED MENTAL STATUS: ICD-10-CM

## 2017-07-15 DIAGNOSIS — N18.9 CHRONIC RENAL INSUFFICIENCY, UNSPECIFIED STAGE: ICD-10-CM

## 2017-07-15 DIAGNOSIS — D64.9 ANEMIA, UNSPECIFIED TYPE: ICD-10-CM

## 2017-07-15 DIAGNOSIS — I10 UNCONTROLLED HYPERTENSION: ICD-10-CM

## 2017-07-15 DIAGNOSIS — E11.65 TYPE 2 DIABETES MELLITUS WITH HYPERGLYCEMIA, WITHOUT LONG-TERM CURRENT USE OF INSULIN: ICD-10-CM

## 2017-07-15 DIAGNOSIS — I49.8 JUNCTIONAL RHYTHM: Primary | ICD-10-CM

## 2017-07-15 DIAGNOSIS — E83.42 HYPOMAGNESEMIA: Primary | ICD-10-CM

## 2017-07-15 DIAGNOSIS — E87.20 LACTIC ACID ACIDOSIS: ICD-10-CM

## 2017-07-15 DIAGNOSIS — Z94.0 KIDNEY TRANSPLANT STATUS: ICD-10-CM

## 2017-07-15 DIAGNOSIS — K56.609 SMALL BOWEL OBSTRUCTION: ICD-10-CM

## 2017-07-15 DIAGNOSIS — R19.7 DIARRHEA: ICD-10-CM

## 2017-07-15 DIAGNOSIS — D72.819 LEUKOPENIA, UNSPECIFIED TYPE: ICD-10-CM

## 2017-07-15 LAB
ACANTHOCYTES BLD QL SMEAR: PRESENT
ALBUMIN SERPL BCP-MCNC: 2.9 G/DL
ALBUMIN SERPL BCP-MCNC: 3.6 G/DL
ALLENS TEST: ABNORMAL
ALP SERPL-CCNC: 67 U/L
ALP SERPL-CCNC: 70 U/L
ALT SERPL W/O P-5'-P-CCNC: 136 U/L
ALT SERPL W/O P-5'-P-CCNC: 50 U/L
AMPHET+METHAMPHET UR QL: NEGATIVE
ANION GAP SERPL CALC-SCNC: 18 MMOL/L
ANION GAP SERPL CALC-SCNC: 24 MMOL/L
ANISOCYTOSIS BLD QL SMEAR: SLIGHT
ANISOCYTOSIS BLD QL SMEAR: SLIGHT
APTT BLDCRRT: 21.9 SEC
APTT BLDCRRT: 21.9 SEC
AST SERPL-CCNC: 144 U/L
AST SERPL-CCNC: 25 U/L
BACTERIA #/AREA URNS AUTO: ABNORMAL /HPF
BACTERIA #/AREA URNS AUTO: ABNORMAL /HPF
BARBITURATES UR QL SCN>200 NG/ML: NEGATIVE
BASOPHILS # BLD AUTO: 0 K/UL
BASOPHILS # BLD AUTO: ABNORMAL K/UL
BASOPHILS NFR BLD: 0 %
BASOPHILS NFR BLD: 1 %
BENZODIAZ UR QL SCN>200 NG/ML: NEGATIVE
BILIRUB SERPL-MCNC: 0.9 MG/DL
BILIRUB SERPL-MCNC: 1 MG/DL
BILIRUB UR QL STRIP: NEGATIVE
BILIRUB UR QL STRIP: NEGATIVE
BUN SERPL-MCNC: 33 MG/DL
BUN SERPL-MCNC: 37 MG/DL
BZE UR QL SCN: NEGATIVE
CALCIUM SERPL-MCNC: 9.3 MG/DL
CALCIUM SERPL-MCNC: 9.9 MG/DL
CANNABINOIDS UR QL SCN: NEGATIVE
CHLORIDE SERPL-SCNC: 103 MMOL/L
CHLORIDE SERPL-SCNC: 107 MMOL/L
CLARITY UR REFRACT.AUTO: CLEAR
CLARITY UR REFRACT.AUTO: CLEAR
CO2 SERPL-SCNC: 20 MMOL/L
CO2 SERPL-SCNC: 9 MMOL/L
COLOR UR AUTO: YELLOW
COLOR UR AUTO: YELLOW
CREAT SERPL-MCNC: 2 MG/DL
CREAT SERPL-MCNC: 2.2 MG/DL
CREAT UR-MCNC: 40 MG/DL
DACRYOCYTES BLD QL SMEAR: ABNORMAL
DACRYOCYTES BLD QL SMEAR: ABNORMAL
DELSYS: ABNORMAL
DIFFERENTIAL METHOD: ABNORMAL
DIFFERENTIAL METHOD: ABNORMAL
EOSINOPHIL # BLD AUTO: 0 K/UL
EOSINOPHIL # BLD AUTO: ABNORMAL K/UL
EOSINOPHIL NFR BLD: 0 %
EOSINOPHIL NFR BLD: 0.8 %
ERYTHROCYTE [DISTWIDTH] IN BLOOD BY AUTOMATED COUNT: 17.2 %
ERYTHROCYTE [DISTWIDTH] IN BLOOD BY AUTOMATED COUNT: 17.4 %
EST. GFR  (AFRICAN AMERICAN): 24.2 ML/MIN/1.73 M^2
EST. GFR  (AFRICAN AMERICAN): 27.2 ML/MIN/1.73 M^2
EST. GFR  (NON AFRICAN AMERICAN): 21 ML/MIN/1.73 M^2
EST. GFR  (NON AFRICAN AMERICAN): 23.6 ML/MIN/1.73 M^2
ETHANOL SERPL-MCNC: <10 MG/DL
FLOW: 2
GLUCOSE SERPL-MCNC: 254 MG/DL
GLUCOSE SERPL-MCNC: 288 MG/DL
GLUCOSE UR QL STRIP: ABNORMAL
GLUCOSE UR QL STRIP: ABNORMAL
HCO3 UR-SCNC: 10.7 MMOL/L (ref 24–28)
HCT VFR BLD AUTO: 27 %
HCT VFR BLD AUTO: 28.2 %
HGB BLD-MCNC: 8 G/DL
HGB BLD-MCNC: 8.3 G/DL
HGB UR QL STRIP: ABNORMAL
HGB UR QL STRIP: ABNORMAL
HYALINE CASTS UR QL AUTO: 0 /LPF
HYALINE CASTS UR QL AUTO: 0 /LPF
HYPOCHROMIA BLD QL SMEAR: ABNORMAL
HYPOCHROMIA BLD QL SMEAR: ABNORMAL
INR PPP: 1.1
INR PPP: 1.2
KETONES UR QL STRIP: NEGATIVE
KETONES UR QL STRIP: NEGATIVE
LACTATE SERPL-SCNC: >12 MMOL/L
LEUKOCYTE ESTERASE UR QL STRIP: ABNORMAL
LEUKOCYTE ESTERASE UR QL STRIP: ABNORMAL
LYMPHOCYTES # BLD AUTO: 0.3 K/UL
LYMPHOCYTES # BLD AUTO: ABNORMAL K/UL
LYMPHOCYTES NFR BLD: 12.1 %
LYMPHOCYTES NFR BLD: 4 %
MAGNESIUM SERPL-MCNC: 1.2 MG/DL
MCH RBC QN AUTO: 25.6 PG
MCH RBC QN AUTO: 25.7 PG
MCHC RBC AUTO-ENTMCNC: 29.4 %
MCHC RBC AUTO-ENTMCNC: 29.6 %
MCV RBC AUTO: 86 FL
MCV RBC AUTO: 87 FL
METHADONE UR QL SCN>300 NG/ML: NEGATIVE
MICROSCOPIC COMMENT: ABNORMAL
MICROSCOPIC COMMENT: ABNORMAL
MODE: ABNORMAL
MONOCYTES # BLD AUTO: 0.1 K/UL
MONOCYTES # BLD AUTO: ABNORMAL K/UL
MONOCYTES NFR BLD: 5 %
MONOCYTES NFR BLD: 5.1 %
NEUTROPHILS # BLD AUTO: 2.1 K/UL
NEUTROPHILS NFR BLD: 80 %
NEUTROPHILS NFR BLD: 81.6 %
NEUTS BAND NFR BLD MANUAL: 10 %
NITRITE UR QL STRIP: NEGATIVE
NITRITE UR QL STRIP: NEGATIVE
NON-SQ EPI CELLS #/AREA URNS AUTO: 1 /HPF
OPIATES UR QL SCN: NEGATIVE
OVALOCYTES BLD QL SMEAR: ABNORMAL
OVALOCYTES BLD QL SMEAR: ABNORMAL
PCO2 BLDA: 23.4 MMHG (ref 35–45)
PCP UR QL SCN>25 NG/ML: NEGATIVE
PH SMN: 7.27 [PH] (ref 7.35–7.45)
PH UR STRIP: 6 [PH] (ref 5–8)
PH UR STRIP: 8 [PH] (ref 5–8)
PHOSPHATE SERPL-MCNC: 3.1 MG/DL
PLATELET # BLD AUTO: 311 K/UL
PLATELET # BLD AUTO: 312 K/UL
PMV BLD AUTO: 10.3 FL
PMV BLD AUTO: 10.9 FL
PO2 BLDA: 65 MMHG (ref 80–100)
POC BE: -16 MMOL/L
POC SATURATED O2: 90 % (ref 95–100)
POIKILOCYTOSIS BLD QL SMEAR: SLIGHT
POIKILOCYTOSIS BLD QL SMEAR: SLIGHT
POLYCHROMASIA BLD QL SMEAR: ABNORMAL
POLYCHROMASIA BLD QL SMEAR: ABNORMAL
POTASSIUM SERPL-SCNC: 4.2 MMOL/L
POTASSIUM SERPL-SCNC: 5.1 MMOL/L
PROT SERPL-MCNC: 6.2 G/DL
PROT SERPL-MCNC: 7.1 G/DL
PROT UR QL STRIP: ABNORMAL
PROT UR QL STRIP: ABNORMAL
PROTHROMBIN TIME: 11.2 SEC
PROTHROMBIN TIME: 12.1 SEC
RBC # BLD AUTO: 3.13 M/UL
RBC # BLD AUTO: 3.23 M/UL
RBC #/AREA URNS AUTO: 1 /HPF (ref 0–4)
RBC #/AREA URNS AUTO: 3 /HPF (ref 0–4)
SAMPLE: ABNORMAL
SCHISTOCYTES BLD QL SMEAR: PRESENT
SCHISTOCYTES BLD QL SMEAR: PRESENT
SITE: ABNORMAL
SODIUM SERPL-SCNC: 140 MMOL/L
SODIUM SERPL-SCNC: 141 MMOL/L
SP GR UR STRIP: 1.01 (ref 1–1.03)
SP GR UR STRIP: 1.01 (ref 1–1.03)
SPHEROCYTES BLD QL SMEAR: ABNORMAL
SPHEROCYTES BLD QL SMEAR: ABNORMAL
SQUAMOUS #/AREA URNS AUTO: 13 /HPF
TOXICOLOGY INFORMATION: NORMAL
TROPONIN I SERPL DL<=0.01 NG/ML-MCNC: 0.07 NG/ML
TSH SERPL DL<=0.005 MIU/L-ACNC: 0.52 UIU/ML
URN SPEC COLLECT METH UR: ABNORMAL
URN SPEC COLLECT METH UR: ABNORMAL
UROBILINOGEN UR STRIP-ACNC: <2 EU/DL
UROBILINOGEN UR STRIP-ACNC: NEGATIVE EU/DL
WBC # BLD AUTO: 12.44 K/UL
WBC # BLD AUTO: 2.56 K/UL
WBC #/AREA URNS AUTO: 20 /HPF (ref 0–5)
WBC #/AREA URNS AUTO: 5 /HPF (ref 0–5)
YEAST UR QL AUTO: ABNORMAL

## 2017-07-15 PROCEDURE — 25000003 PHARM REV CODE 250: Performed by: INTERNAL MEDICINE

## 2017-07-15 PROCEDURE — 87040 BLOOD CULTURE FOR BACTERIA: CPT | Mod: 59

## 2017-07-15 PROCEDURE — 63600175 PHARM REV CODE 636 W HCPCS: Performed by: INTERNAL MEDICINE

## 2017-07-15 PROCEDURE — 96375 TX/PRO/DX INJ NEW DRUG ADDON: CPT

## 2017-07-15 PROCEDURE — 81000 URINALYSIS NONAUTO W/SCOPE: CPT | Mod: 91

## 2017-07-15 PROCEDURE — 99900035 HC TECH TIME PER 15 MIN (STAT)

## 2017-07-15 PROCEDURE — 83605 ASSAY OF LACTIC ACID: CPT

## 2017-07-15 PROCEDURE — 51702 INSERT TEMP BLADDER CATH: CPT

## 2017-07-15 PROCEDURE — 96372 THER/PROPH/DIAG INJ SC/IM: CPT

## 2017-07-15 PROCEDURE — 80053 COMPREHEN METABOLIC PANEL: CPT | Mod: 91

## 2017-07-15 PROCEDURE — 84484 ASSAY OF TROPONIN QUANT: CPT

## 2017-07-15 PROCEDURE — 63600175 PHARM REV CODE 636 W HCPCS

## 2017-07-15 PROCEDURE — 85730 THROMBOPLASTIN TIME PARTIAL: CPT | Mod: 91

## 2017-07-15 PROCEDURE — 63600175 PHARM REV CODE 636 W HCPCS: Performed by: EMERGENCY MEDICINE

## 2017-07-15 PROCEDURE — 25000003 PHARM REV CODE 250

## 2017-07-15 PROCEDURE — 83735 ASSAY OF MAGNESIUM: CPT

## 2017-07-15 PROCEDURE — 36600 WITHDRAWAL OF ARTERIAL BLOOD: CPT

## 2017-07-15 PROCEDURE — 27200966 HC CLOSED SUCTION SYSTEM

## 2017-07-15 PROCEDURE — 93010 ELECTROCARDIOGRAM REPORT: CPT | Mod: ,,, | Performed by: NUCLEAR MEDICINE

## 2017-07-15 PROCEDURE — 96376 TX/PRO/DX INJ SAME DRUG ADON: CPT

## 2017-07-15 PROCEDURE — 85025 COMPLETE CBC W/AUTO DIFF WBC: CPT

## 2017-07-15 PROCEDURE — 85610 PROTHROMBIN TIME: CPT | Mod: 91

## 2017-07-15 PROCEDURE — 85027 COMPLETE CBC AUTOMATED: CPT

## 2017-07-15 PROCEDURE — 99285 EMERGENCY DEPT VISIT HI MDM: CPT | Mod: 25,27

## 2017-07-15 PROCEDURE — 81000 URINALYSIS NONAUTO W/SCOPE: CPT

## 2017-07-15 PROCEDURE — 27100080 HC AIRWAY ADAPTER-END TIDAL CO2

## 2017-07-15 PROCEDURE — 84443 ASSAY THYROID STIM HORMONE: CPT

## 2017-07-15 PROCEDURE — 93005 ELECTROCARDIOGRAM TRACING: CPT

## 2017-07-15 PROCEDURE — 82803 BLOOD GASES ANY COMBINATION: CPT

## 2017-07-15 PROCEDURE — 36556 INSERT NON-TUNNEL CV CATH: CPT

## 2017-07-15 PROCEDURE — 25000003 PHARM REV CODE 250: Performed by: EMERGENCY MEDICINE

## 2017-07-15 PROCEDURE — 96368 THER/DIAG CONCURRENT INF: CPT | Mod: 59

## 2017-07-15 PROCEDURE — 93010 ELECTROCARDIOGRAM REPORT: CPT | Mod: 76,,, | Performed by: NUCLEAR MEDICINE

## 2017-07-15 PROCEDURE — 96366 THER/PROPH/DIAG IV INF ADDON: CPT

## 2017-07-15 PROCEDURE — 96365 THER/PROPH/DIAG IV INF INIT: CPT

## 2017-07-15 PROCEDURE — 99284 EMERGENCY DEPT VISIT MOD MDM: CPT | Mod: 25

## 2017-07-15 PROCEDURE — 80307 DRUG TEST PRSMV CHEM ANLYZR: CPT

## 2017-07-15 PROCEDURE — 85610 PROTHROMBIN TIME: CPT

## 2017-07-15 PROCEDURE — 31500 INSERT EMERGENCY AIRWAY: CPT

## 2017-07-15 PROCEDURE — 80320 DRUG SCREEN QUANTALCOHOLS: CPT

## 2017-07-15 PROCEDURE — 85730 THROMBOPLASTIN TIME PARTIAL: CPT

## 2017-07-15 PROCEDURE — 84100 ASSAY OF PHOSPHORUS: CPT

## 2017-07-15 PROCEDURE — 99900035 HC TECH TIME PER 15 MIN (STAT): Performed by: GENERAL PRACTICE

## 2017-07-15 PROCEDURE — 93005 ELECTROCARDIOGRAM TRACING: CPT | Performed by: GENERAL PRACTICE

## 2017-07-15 PROCEDURE — 85007 BL SMEAR W/DIFF WBC COUNT: CPT

## 2017-07-15 PROCEDURE — 27000221 HC OXYGEN, UP TO 24 HOURS

## 2017-07-15 PROCEDURE — 80053 COMPREHEN METABOLIC PANEL: CPT

## 2017-07-15 PROCEDURE — 96367 TX/PROPH/DG ADDL SEQ IV INF: CPT

## 2017-07-15 RX ORDER — INSULIN ASPART 100 [IU]/ML
3 INJECTION, SOLUTION INTRAVENOUS; SUBCUTANEOUS
Status: COMPLETED | OUTPATIENT
Start: 2017-07-15 | End: 2017-07-15

## 2017-07-15 RX ORDER — METOPROLOL SUCCINATE 50 MG/1
50 TABLET, EXTENDED RELEASE ORAL DAILY
COMMUNITY

## 2017-07-15 RX ORDER — NALOXONE HYDROCHLORIDE 1 MG/ML
INJECTION INTRAMUSCULAR; INTRAVENOUS; SUBCUTANEOUS CODE/TRAUMA/SEDATION MEDICATION
Status: COMPLETED | OUTPATIENT
Start: 2017-07-15 | End: 2017-07-15

## 2017-07-15 RX ORDER — SODIUM CHLORIDE 9 MG/ML
500 INJECTION, SOLUTION INTRAVENOUS
Status: COMPLETED | OUTPATIENT
Start: 2017-07-15 | End: 2017-07-15

## 2017-07-15 RX ORDER — CLONIDINE HYDROCHLORIDE 0.2 MG/1
0.2 TABLET ORAL
Status: COMPLETED | OUTPATIENT
Start: 2017-07-15 | End: 2017-07-15

## 2017-07-15 RX ORDER — CEFEPIME HYDROCHLORIDE 1 G/50ML
1 INJECTION, SOLUTION INTRAVENOUS ONCE
Status: COMPLETED | OUTPATIENT
Start: 2017-07-16 | End: 2017-07-16

## 2017-07-15 RX ORDER — MAGNESIUM SULFATE 1 G/100ML
2 INJECTION INTRAVENOUS
Status: COMPLETED | OUTPATIENT
Start: 2017-07-15 | End: 2017-07-15

## 2017-07-15 RX ORDER — HYDRALAZINE HYDROCHLORIDE 25 MG/1
100 TABLET, FILM COATED ORAL
Status: COMPLETED | OUTPATIENT
Start: 2017-07-15 | End: 2017-07-15

## 2017-07-15 RX ORDER — GLUCAGON 1 MG
3 KIT INJECTION
Status: COMPLETED | OUTPATIENT
Start: 2017-07-15 | End: 2017-07-15

## 2017-07-15 RX ADMIN — EPINEPHRINE 1 MG: 1 INJECTION PARENTERAL at 11:07

## 2017-07-15 RX ADMIN — SODIUM CHLORIDE 1362 ML: 0.9 INJECTION, SOLUTION INTRAVENOUS at 11:07

## 2017-07-15 RX ADMIN — CLONIDINE HYDROCHLORIDE 0.2 MG: 0.2 TABLET ORAL at 02:07

## 2017-07-15 RX ADMIN — INSULIN ASPART 3 UNITS: 100 INJECTION, SOLUTION INTRAVENOUS; SUBCUTANEOUS at 02:07

## 2017-07-15 RX ADMIN — MAGNESIUM SULFATE IN DEXTROSE 2 G: 10 INJECTION, SOLUTION INTRAVENOUS at 02:07

## 2017-07-15 RX ADMIN — GLUCAGON HYDROCHLORIDE 3 MG: 1 INJECTION, POWDER, FOR SOLUTION INTRAMUSCULAR; INTRAVENOUS; SUBCUTANEOUS at 10:07

## 2017-07-15 RX ADMIN — NALOXONE HYDROCHLORIDE 2 MG: 1 INJECTION PARENTERAL at 09:07

## 2017-07-15 RX ADMIN — HYDRALAZINE HYDROCHLORIDE 100 MG: 25 TABLET ORAL at 03:07

## 2017-07-15 RX ADMIN — ATROPINE SULFATE 1 MG: 1 INJECTION, SOLUTION INTRAMUSCULAR; INTRAVENOUS; SUBCUTANEOUS at 11:07

## 2017-07-15 RX ADMIN — SODIUM CHLORIDE 500 ML: 0.9 INJECTION, SOLUTION INTRAVENOUS at 02:07

## 2017-07-15 NOTE — ED NOTES
Resting quietly in bed, eyes closed, resp e/u, VS improving. No distress noted. IVF completed and stopped. Safety precautions in place, hasn't vomited but hasn't eaten much either. No fam @ Bs. Will continue to monitor closely.

## 2017-07-15 NOTE — ED NOTES
Pt supplied with water and ida crackers per MD request for PO challenge to ensure no vomiting. No distress noted, resting comfortably. Will continue to monitor closely. Safety precautions in place, CB in reach. Will continue to monitor. IV infusing w/o complications.

## 2017-07-15 NOTE — ED NOTES
Patient placed on continuous cardiac monitor (NSR 67), automatic blood pressure cuff and continuous pulse oximeter.

## 2017-07-15 NOTE — ED NOTES
Meds given w/o complication. Pt resting in bed, states comfortable and now would like to rest. Assisted to comfortable position on L side, lights lowered, warm blankets supplied. IV Mag infusing at ordered rate via dose mode on pump, NS bolus infusing to gravity, IV site intact/patent, secured. Pt denies pain. Urine flowing freely from catheter. No other needs voiced at this time. CB in reach, safety precautions in place. Will continue close monitoring.

## 2017-07-15 NOTE — ED NOTES
Family here to get pt for planned discharge. Cath removed by JESSICA Varner, pt tolerated well. PIV d/c'd per protocol, c/d/i dressing placed. Pt able to stand with minimal assistance and transfer self to wheelchair for discharge, states feels better. Discussed with pt and family findings, d/c instructions, s/sx to return to Ed, and need for close f/u w/ PCP and nephrology/transplant Mds- both verbalize understanding and agreement. Encouraged return to ED for any concerns of worsening symptoms. No active vomiting or diarrhea in Ed. VS improving, MD sanders w/ BP upon discharge from ED.

## 2017-07-15 NOTE — ED NOTES
Umaña placed per VO Dr. Juan, pt tolerated well, urine sample obtained and sent to lab. 20g PIV to L hand x 2 attempt, pt tolerated well, labwork obtained and sent to lab per protocol. Pt assisted to gown and more comfortable position, turned, has 3 small (less than dime sized) areas of pressure ulceration, stage 2, no redness or swelling, no drainage. Placed on absorbant pad and positioned to minimize pressure. Assessment as noted. No acute distress noted. No fam @ BS at this time. MD Juan notified of BP and verbalizes understanding, awaiting labwork to determine appropriate medications. Pt denies headache, vision changes, CP or palpitations. States has been nauseated, vomiting since last night, feels generally weak, didn't take any meds today including HTN and anti-rejection meds (hx of kidney transplant in early 2000s). NSR noted on monitor, O2 sat stable on RA. Will continue to monitor.

## 2017-07-15 NOTE — ED NOTES
Resting in bed comfortably. IV infusing w/o complication- Mag and IVF appx 1/2 completed. No distress. Has eaten and drank a small amount, c/o 'no appetite'. Vs as noted, will give 2nd BP med. Will continue close monitoring, safety precautions in place.

## 2017-07-15 NOTE — ED PROVIDER NOTES
SCRIBE #1 NOTE: I, William Pratt, am scribing for, and in the presence of, Riky Everett*. I have scribed the entire note.      History      Chief Complaint   Patient presents with    Fatigue       Review of patient's allergies indicates:  No Known Allergies     HPI   HPI    7/15/2017, 12:23 PM   History obtained from the patient      History of Present Illness: Mirtha Zamora is a 77 y.o. female patient who presents to the Emergency Department for an evaluation of fatigue which onset gradually a few days ago. Pt states she was recently d/c from the hospital for low blood pressure. She also reports she has not been compliant with her diabetes or blood pressure medication and that she has not eat today. Pt reports hx of a kidney transplant. Symptoms are constant and moderate in severity. No mitigating or exacerbating factors reported. Associated sxs include diarrhea, nausea, and decreased appetite. Patient denies any fever, chills, diaphoresis, SOB, CP, abdominal pain, constipation, dysuria, hematuria, decreased urination, HA, weakness, and all other sxs at this time. No further complaints or concerns at this time.       Arrival mode: EMS    PCP: [facility]       Past Medical History:  Past Medical History:   Diagnosis Date    Diabetes mellitus     GERD (gastroesophageal reflux disease)     H/O kidney transplant     Hypertension     Renal disorder        Past Surgical History:  Past Surgical History:   Procedure Laterality Date    DIALYSIS FISTULA CREATION Bilateral     KIDNEY TRANSPLANT Right          Family History:  Family history reviewed not relevant    Social History:  Social History     Social History Main Topics    Smoking status: Former Smoker     Types: Cigarettes     Quit date: 2007    Smokeless tobacco: Never Used    Alcohol use No    Drug use: No    Sexual activity: Unknown       ROS   Review of Systems   Constitutional: Positive for appetite change (Decreased) and  fatigue. Negative for chills, diaphoresis and fever.   HENT: Negative for congestion, sore throat and trouble swallowing.    Respiratory: Negative for cough and shortness of breath.    Cardiovascular: Negative for chest pain.   Gastrointestinal: Positive for diarrhea and nausea. Negative for abdominal pain, constipation and vomiting.   Genitourinary: Negative for decreased urine volume, difficulty urinating, dysuria, frequency and urgency.   Musculoskeletal: Negative for back pain, neck pain and neck stiffness.   Skin: Negative for rash.   Neurological: Negative for dizziness, weakness, light-headedness and headaches.   Hematological: Does not bruise/bleed easily.     Physical Exam      Initial Vitals   BP Pulse Resp Temp SpO2   -- -- -- -- --      MAP       --          Physical Exam  Nursing Notes and Vital Signs Reviewed.  Constitutional: Patient is in no acute distress. Well-developed and well-nourished. Pt is pale.  Head: Atraumatic. Normocephalic.  Eyes: PERRL. EOM intact. Conjunctivae are pale. No scleral icterus. Surgical changes to left eye noted.   ENT: Mucous membranes are dry. Oropharynx is clear and symmetric.    Neck: Supple. Full ROM. No lymphadenopathy.  Cardiovascular: Regular rate. Regular rhythm. Grade 3 systolic murmur. No rubs, or gallops. Distal pulses are 2+ and symmetric.  Pulmonary/Chest: No respiratory distress. Clear to auscultation bilaterally. No wheezing, rales, or rhonchi.  Abdominal: Soft and non-distended.  There is no tenderness.   Musculoskeletal: Moves all extremities. No obvious deformities. AV fistula noted to right arm with a positive thrill.  Skin: Warm and dry.  Neurological:  Patient is alert and oriented to person, place and time. Pupils ERRL and EOM normal. Strength is full bilaterally; it is equal and 5/5 in bilateral upper and lower extremities. There is no pronator drift of outstretched arms. Light touch sense is intact. Speech is clear and normal. No acute focal  neurological deficits noted.  Psychiatric: Normal affect. Good eye contact. Appropriate in content.    ED Course    Procedures  ED Vital Signs:  Vitals:    07/15/17 1246 07/15/17 1317 07/15/17 1326 07/15/17 1332   BP: (!) 200/79 (!) 210/85 (!) 200/80 (!) 219/80   Pulse: 68 66 66 66   Resp: 20 20     Temp:       TempSrc:       SpO2:   100% 100%   Weight:       Height:        07/15/17 1338 07/15/17 1340 07/15/17 1346 07/15/17 1401   BP:   (!) 196/82 (!) 187/79   Pulse: 63 63 67 66   Resp:       Temp:       TempSrc:       SpO2:   100% 100%   Weight:       Height:        07/15/17 1404 07/15/17 1417 07/15/17 1431 07/15/17 1501   BP: (!) 187/79 (!) 206/85 (!) 209/81 (!) 191/70   Pulse:  68 66 64   Resp:  18     Temp:       TempSrc:       SpO2:  100% 100% 95%   Weight:       Height:        07/15/17 1505 07/15/17 1516 07/15/17 1531   BP: (!) 191/70 (!) 167/70 (!) 178/74   Pulse:  62 61   Resp:  16 14   Temp:      TempSrc:      SpO2:  (!) 89% 97%   Weight:      Height:          Abnormal Lab Results:  Labs Reviewed   CBC W/ AUTO DIFFERENTIAL - Abnormal; Notable for the following:        Result Value    RBC 3.13 (*)     Hemoglobin 8.0 (*)     Hematocrit 27.0 (*)     MCH 25.6 (*)     MCHC 29.6 (*)     RDW 17.2 (*)     Gran% 80.0 (*)     Lymph% 4.0 (*)     All other components within normal limits   COMPREHENSIVE METABOLIC PANEL - Abnormal; Notable for the following:     CO2 20 (*)     Glucose 288 (*)     BUN, Bld 33 (*)     Creatinine 2.0 (*)     ALT 50 (*)     Anion Gap 18 (*)     eGFR if  27.2 (*)     eGFR if non  23.6 (*)     All other components within normal limits   MAGNESIUM - Abnormal; Notable for the following:     Magnesium 1.2 (*)     All other components within normal limits   URINALYSIS - Abnormal; Notable for the following:     Protein, UA 2+ (*)     Glucose, UA 2+ (*)     Occult Blood UA Trace (*)     Leukocytes, UA Trace (*)     All other components within normal limits    URINALYSIS MICROSCOPIC - Abnormal; Notable for the following:     WBC, UA 20 (*)     Bacteria, UA Moderate (*)     All other components within normal limits   PHOSPHORUS   APTT   PROTIME-INR   TSH   TSH        All Lab Results:  Results for orders placed or performed during the hospital encounter of 07/15/17   CBC auto differential   Result Value Ref Range    WBC 12.44 3.90 - 12.70 K/uL    RBC 3.13 (L) 4.00 - 5.40 M/uL    Hemoglobin 8.0 (L) 12.0 - 16.0 g/dL    Hematocrit 27.0 (L) 37.0 - 48.5 %    MCV 86 82 - 98 fL    MCH 25.6 (L) 27.0 - 31.0 pg    MCHC 29.6 (L) 32.0 - 36.0 %    RDW 17.2 (H) 11.5 - 14.5 %    Platelets 312 150 - 350 K/uL    MPV 10.3 9.2 - 12.9 fL    Lymph # CANCELED 1.0 - 4.8 K/uL    Mono # CANCELED 0.3 - 1.0 K/uL    Eos # CANCELED 0.0 - 0.5 K/uL    Baso # CANCELED 0.00 - 0.20 K/uL    Gran% 80.0 (H) 38.0 - 73.0 %    Lymph% 4.0 (L) 18.0 - 48.0 %    Mono% 5.0 4.0 - 15.0 %    Eosinophil% 0.0 0.0 - 8.0 %    Basophil% 1.0 0.0 - 1.9 %    Bands 10.0 %    Aniso Slight     Poik Slight     Poly Occasional     Hypo Occasional     Ovalocytes Occasional     Tear Drop Cells Occasional     Spherocytes Occasional     Schistocytes Present     Differential Method Manual    Comprehensive metabolic panel   Result Value Ref Range    Sodium 141 136 - 145 mmol/L    Potassium 4.2 3.5 - 5.1 mmol/L    Chloride 103 95 - 110 mmol/L    CO2 20 (L) 23 - 29 mmol/L    Glucose 288 (H) 70 - 110 mg/dL    BUN, Bld 33 (H) 8 - 23 mg/dL    Creatinine 2.0 (H) 0.5 - 1.4 mg/dL    Calcium 9.9 8.7 - 10.5 mg/dL    Total Protein 7.1 6.0 - 8.4 g/dL    Albumin 3.6 3.5 - 5.2 g/dL    Total Bilirubin 0.9 0.1 - 1.0 mg/dL    Alkaline Phosphatase 70 55 - 135 U/L    AST 25 10 - 40 U/L    ALT 50 (H) 10 - 44 U/L    Anion Gap 18 (H) 8 - 16 mmol/L    eGFR if African American 27.2 (A) >60 mL/min/1.73 m^2    eGFR if non  23.6 (A) >60 mL/min/1.73 m^2   Magnesium   Result Value Ref Range    Magnesium 1.2 (L) 1.6 - 2.6 mg/dL   Phosphorus   Result  Value Ref Range    Phosphorus 3.1 2.7 - 4.5 mg/dL   APTT   Result Value Ref Range    aPTT 21.9 21.0 - 32.0 sec   Protime-INR   Result Value Ref Range    Prothrombin Time 11.2 9.0 - 12.5 sec    INR 1.1 0.8 - 1.2   Urinalysis Catheterized   Result Value Ref Range    Specimen UA Urine, Catheterized     Color, UA Yellow Yellow, Straw, Cornelia    Appearance, UA Clear Clear    pH, UA 8.0 5.0 - 8.0    Specific Gravity, UA 1.010 1.005 - 1.030    Protein, UA 2+ (A) Negative    Glucose, UA 2+ (A) Negative    Ketones, UA Negative Negative    Bilirubin (UA) Negative Negative    Occult Blood UA Trace (A) Negative    Nitrite, UA Negative Negative    Urobilinogen, UA <2.0 <2.0 EU/dL    Leukocytes, UA Trace (A) Negative   Urinalysis Microscopic   Result Value Ref Range    RBC, UA 1 0 - 4 /hpf    WBC, UA 20 (H) 0 - 5 /hpf    Bacteria, UA Moderate (A) None-Occ /hpf    Squam Epithel, UA 13 /hpf    Hyaline Casts, UA 0 0-1/lpf /lpf    Microscopic Comment SEE COMMENT    TSH   Result Value Ref Range    TSH 0.520 0.400 - 4.000 uIU/mL       Imaging Results:  Imaging Results          X-Ray Chest 1 View (Final result)  Result time 07/15/17 13:08:24    Final result by Reginaldo Sosa III, MD (07/15/17 13:08:24)                 Impression:     Borderline heart size. Grossly clear lung fields. Retrocardiac region suboptimally seen.      Electronically signed by: REGINALDO SOSA MD  Date:     07/15/17  Time:    13:08              Narrative:    Chest x-ray, single view.    Clinical indication: Weakness.    Borderline heart size. Postop changes suggested a left upper extremity/axilla. Peripheral lung zones grossly clear. Retrocardiac region poorly seen. Surgical clips also noted along the right upper extremity medially.                             X-Ray Abdomen AP 1 View (KUB) (Final result)  Result time 07/15/17 13:04:01    Final result by Reginaldo Sosa III, MD (07/15/17 13:04:01)                 Impression:     As  above      Electronically signed by: MARILYNN MANSFIELD MD  Date:     07/15/17  Time:    13:04              Narrative:    Abdomen x-ray, single view.    Indication: Diarrhea.    There are a few loops of bowel in the mid and upper abdomen which are moderately distended with a few nondistended loops of small bowel noted and minimal colon gas. While this could represent an adynamic ileus, partial or developing small bowel obstruction is not excluded. Consider followup studies as indicated, perfectly with PA and lateral views in the department.                                  The EKG was ordered, reviewed, and independently interpreted by the ED provider.  Interpretation time: 12:43  Rate: 69 BPM  Rhythm: Sinus rhythm with 1st degree AV block  Interpretation: No STEMI.      The Emergency Provider reviewed the vital signs and test results, which are outlined above.    ED Discussion     3:22 PM: Re-evaluated pt. Pt is resting comfortably and is in no acute distress.  Pt states she is feeling much better and is zoey to eat and drink normally. Renal function has improved compared to prior. D/w pt all pertinent results. D/w pt any concerns expressed at this time. Answered all questions. Pt expresses understanding at this time.    3:53 PM: Reassessed pt at this time.  Pt states her condition has improved at this time. Discussed with pt all pertinent ED information and results. Discussed pt dx of hypomagnesemia and plan of tx. Gave pt all f/u and return to the ED instructions. All questions and concerns were addressed at this time. Pt expresses understanding of information and instructions, and is comfortable with plan to discharge. Pt is stable for discharge.      I discussed with patient and/or family/caretaker that evaluation in the ED does not suggest any emergent or life threatening medical conditions requiring immediate intervention beyond what was provided in the ED, and I believe patient is safe for discharge.   Regardless, an unremarkable evaluation in the ED does not preclude the development or presence of a serious of life threatening condition. As such, patient was instructed to return immediately for any worsening or change in current symptoms.      ED Medication(s):  Medications   magnesium sulfate in dextrose IVPB (premix) 2 g (0 g Intravenous Stopped 7/15/17 1535)   cloNIDine tablet 0.2 mg (0.2 mg Oral Given 7/15/17 1404)   insulin aspart pen 3 Units (3 Units Subcutaneous Given 7/15/17 1405)   0.9%  NaCl infusion (0 mLs Intravenous Stopped 7/15/17 1535)   hydrALAZINE tablet 100 mg (100 mg Oral Given 7/15/17 1505)       New Prescriptions    No medications on file             Medical Decision Making    Medical Decision Making:   Clinical Tests:   Lab Tests: Ordered and Reviewed  Radiological Study: Reviewed and Ordered  Medical Tests: Ordered and Reviewed           Scribe Attestation:   Scribe #1: I performed the above scribed service and the documentation accurately describes the services I performed. I attest to the accuracy of the note.    Attending:   Physician Attestation Statement for Scribe #1: I, Riky Everett*, personally performed the services described in this documentation, as scribed by William Pratt, in my presence, and it is both accurate and complete.          Clinical Impression       ICD-10-CM ICD-9-CM   1. Hypomagnesemia E83.42 275.2   2. Weakness R53.1 780.79   3. Diarrhea R19.7 787.91   4. Uncontrolled type 2 diabetes mellitus with diabetic nephropathy, with long-term current use of insulin E11.21 250.42    E11.65 583.81    Z79.4 V58.67   5. Uncontrolled hypertension I10 401.9   6. Stage 4 chronic kidney disease N18.4 585.4   7. Anemia due to chronic kidney disease N18.9 285.21    D63.1        Disposition:   Disposition: Discharged  Condition: Stable         Riky Curtis MD  07/15/17 [id number]       Riky Curtis MD  07/15/17 [id number]

## 2017-07-16 VITALS
DIASTOLIC BLOOD PRESSURE: 68 MMHG | HEART RATE: 80 BPM | OXYGEN SATURATION: 100 % | SYSTOLIC BLOOD PRESSURE: 154 MMHG | RESPIRATION RATE: 20 BRPM | BODY MASS INDEX: 16.66 KG/M2 | WEIGHT: 100.06 LBS | TEMPERATURE: 100 F

## 2017-07-16 LAB
ALLENS TEST: ABNORMAL
DELSYS: ABNORMAL
HCO3 UR-SCNC: 7.7 MMOL/L (ref 24–28)
MODE: ABNORMAL
PCO2 BLDA: 18.9 MMHG (ref 35–45)
PH SMN: 7.22 [PH] (ref 7.35–7.45)
PO2 BLDA: 188 MMHG (ref 80–100)
POC BE: -20 MMOL/L
POC SATURATED O2: 99 % (ref 95–100)
SAMPLE: ABNORMAL
SITE: ABNORMAL

## 2017-07-16 PROCEDURE — 63600175 PHARM REV CODE 636 W HCPCS: Performed by: EMERGENCY MEDICINE

## 2017-07-16 PROCEDURE — 25000003 PHARM REV CODE 250: Performed by: EMERGENCY MEDICINE

## 2017-07-16 RX ORDER — EPINEPHRINE 1 MG/ML
INJECTION INTRAMUSCULAR; INTRAVENOUS; SUBCUTANEOUS CODE/TRAUMA/SEDATION MEDICATION
Status: DISCONTINUED | OUTPATIENT
Start: 2017-07-15 | End: 2017-07-16 | Stop reason: HOSPADM

## 2017-07-16 RX ORDER — CALCIUM GLUCONATE 98 MG/ML
2 INJECTION, SOLUTION INTRAVENOUS
Status: COMPLETED | OUTPATIENT
Start: 2017-07-16 | End: 2017-07-16

## 2017-07-16 RX ORDER — ATROPINE SULFATE 1 MG/ML
INJECTION, SOLUTION INTRAMUSCULAR; INTRAVENOUS; SUBCUTANEOUS CODE/TRAUMA/SEDATION MEDICATION
Status: DISCONTINUED | OUTPATIENT
Start: 2017-07-15 | End: 2017-07-16 | Stop reason: HOSPADM

## 2017-07-16 RX ORDER — SODIUM BICARBONATE 1 MEQ/ML
SYRINGE (ML) INTRAVENOUS CODE/TRAUMA/SEDATION MEDICATION
Status: COMPLETED | OUTPATIENT
Start: 2017-07-16 | End: 2017-07-16

## 2017-07-16 RX ORDER — EPINEPHRINE 1 MG/ML
INJECTION INTRAMUSCULAR; INTRAVENOUS; SUBCUTANEOUS CODE/TRAUMA/SEDATION MEDICATION
Status: COMPLETED | OUTPATIENT
Start: 2017-07-16 | End: 2017-07-16

## 2017-07-16 RX ORDER — INDOMETHACIN 25 MG/1
100 CAPSULE ORAL
Status: COMPLETED | OUTPATIENT
Start: 2017-07-16 | End: 2017-07-16

## 2017-07-16 RX ORDER — NOREPINEPHRINE BITARTRATE/D5W 4MG/250ML
0.1 PLASTIC BAG, INJECTION (ML) INTRAVENOUS CONTINUOUS
Status: DISCONTINUED | OUTPATIENT
Start: 2017-07-16 | End: 2017-07-16 | Stop reason: HOSPADM

## 2017-07-16 RX ADMIN — EPINEPHRINE 1 MG: 1 INJECTION PARENTERAL at 12:07

## 2017-07-16 RX ADMIN — CEFEPIME HYDROCHLORIDE 1 G: 1 INJECTION, SOLUTION INTRAVENOUS at 12:07

## 2017-07-16 RX ADMIN — SODIUM BICARBONATE 100 MEQ: 84 INJECTION, SOLUTION INTRAVENOUS at 01:07

## 2017-07-16 RX ADMIN — CALCIUM GLUCONATE 2 G: 94 INJECTION, SOLUTION INTRAVENOUS at 01:07

## 2017-07-16 RX ADMIN — Medication 0.1 MCG/KG/MIN: at 01:07

## 2017-07-16 RX ADMIN — SODIUM BICARBONATE 100 MEQ: 84 INJECTION, SOLUTION INTRAVENOUS at 12:07

## 2017-07-16 RX ADMIN — EPINEPHRINE 1 MG: 1 INJECTION PARENTERAL at 01:07

## 2017-07-16 RX ADMIN — VANCOMYCIN HYDROCHLORIDE 750 MG: 1 INJECTION, POWDER, LYOPHILIZED, FOR SOLUTION INTRAVENOUS at 12:07

## 2017-07-16 NOTE — ED NOTES
0057: Asystole noted. CPR initiated per md request and 1mg of epi given  0058: 2 amps of bicarb given  0059: Pulse check   0100: 1mg of epi and 2 amps of bicarb given. CPR reinitiated  0102: Pulse check. EKG: Accel. junction

## 2017-07-16 NOTE — ED NOTES
Pacing MA increased to 65 as per Dr. Up order. Dr. Up at bedside for patient evaluation.  Pt BP has dropped to 64/38.

## 2017-07-16 NOTE — ED NOTES
Pt's HR dropped to 32.Dr. Up is at the bedside. CPR initiated per Dr. Up's order. 1mg of epi and 3mg total of atropine.

## 2017-07-16 NOTE — ED NOTES
~ 400 ml of gastric contents noted in suction canister.  Pt placed on continuous wall suction as per Dr. Up.

## 2017-07-16 NOTE — ED NOTES
Pt was brought into ED via [facility] for AMS. Unknown cause. Nasal trumpet present in R nare. Pt bradycardic upon arrival, HR 47.  Upon arrival pt was unresponsive. Pt was given 2mg of narcan. GSC quickly changed to 14.

## 2017-07-16 NOTE — ED NOTES
Patient became unresponsive, RR decreased and  HR decreased. CPR started. At  2333 Patient sucessfully intubated by MD with 7.5cm ETT secured 20 at Gums. CO2 detector positive for correct tube placement, BS equal and Chest Xray verified placement at T-4. CPR continued until patient vitals stable. Patient then placed on ventilator with the following settings: AC, vt 500, rate 15, peep +5 and FiO2 100%.     0010-  SPO2 100%, FiO2 decreased to 50% and then to 40%. Follow up ABG done at 0037. FIO2 decreased to 35%. SPO2 still 100%.    0100- Patient HR decreased. CPR started. Patient removed from vent and hand bagged. Once vitals stabilized patient placed back on vent at 0107.  Patient will be transferred to [facility]. Will continue to monitor.

## 2017-07-16 NOTE — ED PROVIDER NOTES
SCRIBE #1 NOTE: I, Corinne Mack, am scribing for, and in the presence of, Jose Roberto Up MD. I have scribed the entire note.      History      Chief Complaint   Patient presents with    Altered Mental Status     Pt presents to ED via [facility]. Minimally responsive to pain. No verbalization. Nasal airway in place.        Review of patient's allergies indicates:  No Known Allergies     HPI   HPI    7/15/2017, 9:50 PM   History obtained from [facility]      History of Present Illness: Mirtha Zamora is a 77 y.o. female patient who presents to the Emergency Department for AMS which onset PTA.  Per [facility], pt was found unresponsive at home. Pt's family is not currently at bedside. Pt was bradycardic en route and upon arrival. Pt was seen at this facility earlier today for fatigue and Dx with hypomagnesemia and uncontrolled DM. Pt was treated and d/c home. No prior Tx given en route due to [facility] being unable to place an IV. Nasal trumpet placed PTA. Pt has Hx of HTN, kidney transplant/CRI, DM. Patient remains altered and unable to provide history.    Arrival mode: [facility]    PCP: [facility]       Past Medical History:  Past Medical History:   Diagnosis Date    Anemia     Anticoagulant long-term use     CHF (congestive heart failure)     Coronary artery disease     Diabetes mellitus     GERD (gastroesophageal reflux disease)     H/O kidney transplant     Hyperlipidemia     Hypertension     Renal disorder     CKD stage 4       Past Surgical History:  Past Surgical History:   Procedure Laterality Date    DIALYSIS FISTULA CREATION Bilateral     KIDNEY TRANSPLANT Right          Family History:  History reviewed. No pertinent family history.    Social History:  Social History     Social History Main Topics    Smoking status: Former Smoker     Types: Cigarettes     Quit date: 2007    Smokeless tobacco: Never Used    Alcohol use No    Drug use: No    Sexual activity: Not given       ROS   Review of Systems    Unable to perform ROS: Mental status change     Physical Exam      Initial Vitals [07/15/17 2143]   BP Pulse Resp Temp SpO2   (!) 107/51 (!) 47 (!) 24 -- 96 %      MAP       69.67         Vitals:    07/16/17 0112   BP: (!) 154/68   Pulse: 80   Resp: 20   Temp:           Physical Exam  Nursing Notes and Vital Signs Reviewed.  Constitutional: Patient is in no apparent distress. Well-developed and well-nourished.  Head: Atraumatic. Normocephalic.  Eyes: Pupils pinpoint B. EOM intact. Conjunctivae are not pale. No scleral icterus.  ENT: Mucous membranes are moist. Oropharynx is clear and symmetric.    Neck: Supple. Full ROM. No lymphadenopathy.  Cardiovascular: Regular bradycardic rhyhtm. No murmurs, rubs, or gallops. Distal pulses are 1+ and symmetric.  Pulmonary/Chest: No respiratory distress. Clear to auscultation bilaterally. No wheezing, rales, or rhonchi.  Abdominal: Moderately distended.  Difficult to assess for tenderness due to AMS  Musculoskeletal: Moves all extremities. No obvious deformities. No edema.   Skin: Warm and dry.  Neurological:  Initial GCS 5 (E4M1V2)  Psychiatric: Normal affect. Good eye contact. Appropriate in content.    ED Course    Central Line  Date/Time: 7/16/2017 1:35 AM  Performed by: AMANDA COOLEY  Consent Done: Emergent Situation  Indications: vascular access and med administration  Anesthesia: local infiltration    Anesthesia:  Local Anesthetic: lidocaine 1% without epinephrine  Preparation: skin prepped with ChloraPrep  Skin prep agent dried: skin prep agent completely dried prior to procedure  Sterile barriers: all five maximum sterile barriers used - cap, mask, sterile gown, sterile gloves, and large sterile sheet  Hand hygiene: hand hygiene performed prior to central venous catheter insertion  Location details: right subclavian  Catheter type: triple lumen  Catheter size: 7 Fr  Catheter Length: 15cm    Number of attempts: 1  Assessment: placement verified by x-ray,  no  pneumothorax on x-ray and successful placement  Complications: none  Estimated blood loss (mL): 0  Post-procedure: line sutured,  chlorhexidine patch,  sterile dressing applied and blood return through all ports  Complications: No    Critical Care  Date/Time: 7/16/2017 1:37 AM  Performed by: AMANDA COOLEY  Authorized by: AMANDA COOLEY   Direct patient critical care time: 90 minutes  Additional history critical care time: 5 minutes  Ordering / reviewing critical care time: 20 minutes  Documentation critical care time: 15 minutes  Consulting other physicians critical care time: 5 minutes  Consult with family critical care time: 15 minutes  Total critical care time (exclusive of procedural time) : 150 minutes  Critical care was necessary to treat or prevent imminent or life-threatening deterioration of the following conditions: cardiac failure, shock and metabolic crisis.  Critical care was time spent personally by me on the following activities: development of treatment plan with patient or surrogate, discussions with consultants, gastric intubation, examination of patient, evaluation of patient's response to treatment, pulse oximetry, transcutaneous pacing, ventilator management, re-evaluation of patient's condition, ordering and review of radiographic studies, ordering and review of laboratory studies and obtaining history from patient or surrogate.    Intubation  Date/Time: 7/22/2017 1:39 AM  Performed by: AMANDA COOLEY  Authorized by: AMANDA COOLEY   Consent Done: Emergent Situation  Indications: respiratory failure  Intubation method: oral  Patient status: unconscious  Preoxygenation: BVM  Laryngoscope size: Mac 4  Tube size: 7.5 mm  Tube type: cuffed  Number of attempts: 1  Cricoid pressure: no  Cords visualized: yes  Post-procedure assessment: chest rise and CO2 detector  Breath sounds: clear and equal and absent over the epigastrium  Cuff inflated: yes  Tube secured with: umbilical  tape  Chest x-ray interpreted by me.  Chest x-ray findings: endotracheal tube in appropriate position  Patient tolerance: Patient tolerated the procedure well with no immediate complications        ED Vital Signs:  Vitals:    07/15/17 2202 07/15/17 2208 07/15/17 2229 07/15/17 2230   BP: (!) 113/53  (!) 108/51    Pulse: (!) 48  (!) 44    Resp: (!) 26  (!) 34    Temp:    100.1 °F (37.8 °C)   TempSrc:    Rectal   SpO2: (!) 94%  (!) 94%    Weight:  45.4 kg (100 lb 1.4 oz)      07/15/17 2232 07/15/17 2252 07/15/17 2312 07/15/17 2343   BP: (!) 96/46 (!) 100/49 (!) 98/44 (!) 144/62   Pulse: (!) 43 (!) 40 (!) 34 70   Resp: (!) 36 (!) 30 (!) 30 (!) 26   Temp:       TempSrc:       SpO2: 96% 99% 96% 100%   Weight:        07/15/17 2352 07/16/17 0002 07/16/17 0012 07/16/17 0022   BP: (!) 120/58 (!) 97/55 (!) 95/50 (!) 64/38   Pulse: 70 70 67 68   Resp: (!) 26 (!) 25 (!) 28 (!) 26   Temp:       TempSrc:       SpO2: 100% 100% 100% 100%   Weight:        07/16/17 0045 07/16/17 0107 07/16/17 0112   BP:  (!) 212/86 (!) 154/68   Pulse: 80 96 80   Resp: (!) 25 17 20   Temp:      TempSrc:      SpO2: 100% 100% 100%   Weight:          Abnormal Lab Results:  Labs Reviewed   URINALYSIS - Abnormal; Notable for the following:        Result Value    Protein, UA 2+ (*)     Glucose, UA 1+ (*)     Occult Blood UA Trace (*)     Leukocytes, UA Trace (*)     All other components within normal limits   TROPONIN I - Abnormal; Notable for the following:     Troponin I 0.070 (*)     All other components within normal limits   CBC W/ AUTO DIFFERENTIAL - Abnormal; Notable for the following:     WBC 2.56 (*)     RBC 3.23 (*)     Hemoglobin 8.3 (*)     Hematocrit 28.2 (*)     MCH 25.7 (*)     MCHC 29.4 (*)     RDW 17.4 (*)     Lymph # 0.3 (*)     Mono # 0.1 (*)     Gran% 81.6 (*)     Lymph% 12.1 (*)     All other components within normal limits   COMPREHENSIVE METABOLIC PANEL - Abnormal; Notable for the following:     CO2 9 (*)     Glucose 254 (*)     BUN,  Bld 37 (*)     Creatinine 2.2 (*)     Albumin 2.9 (*)      (*)      (*)     Anion Gap 24 (*)     eGFR if  24.2 (*)     eGFR if non  21.0 (*)     All other components within normal limits    Narrative:       CO2  critical result(s) called and verbal readback obtained from   Gardenia Pineda RN., 07/15/2017 22:24   LACTIC ACID, PLASMA - Abnormal; Notable for the following:     Lactate (Lactic Acid) >12.0 (*)     All other components within normal limits    Narrative:      Lactic Acid  critical result(s) called and verbal readback obtained   from Gardenia Pineda RN., 07/15/2017 22:22   URINALYSIS MICROSCOPIC - Abnormal; Notable for the following:     Non-Squam Epith 1 (*)     All other components within normal limits   ISTAT PROCEDURE - Abnormal; Notable for the following:     POC PH 7.269 (*)     POC PCO2 23.4 (*)     POC PO2 65 (*)     POC HCO3 10.7 (*)     POC SATURATED O2 90 (*)     All other components within normal limits   ISTAT PROCEDURE - Abnormal; Notable for the following:     POC PH 7.216 (*)     POC PCO2 18.9 (*)     POC PO2 188 (*)     POC HCO3 7.7 (*)     All other components within normal limits   CULTURE, BLOOD   CULTURE, BLOOD   APTT   ALCOHOL,MEDICAL (ETHANOL)   DRUG SCREEN PANEL, URINE EMERGENCY   PROTIME-INR          Imaging Results:  Imaging Results          X-Ray Chest AP Portable (In process)                X-Ray Chest AP Portable (Final result)  Result time 07/15/17 23:24:21    Final result by Lasha Mccall Jr., MD (07/15/17 23:24:21)                 Impression:          As above      Electronically signed by: LASHA MCCALL MD  Date:     07/15/17  Time:    23:24              Narrative:    EXAM:   XR CHEST AP PORTABLE    CLINICAL HISTORY:  central line placement evaluation    COMPARISON:  07/15/2017 2009 hrs.    FINDINGS:   Right-sided central venous catheter in place, entering from a subclavian approach with the tip projecting in the junction of superior  vena cava and right atrium.  No pneumothorax.  Findings otherwise stable.                             X-Ray Chest AP Portable (Final result)  Result time 07/15/17 22:25:05    Final result by Lasha Mccall Jr., MD (07/15/17 22:25:05)                 Impression:          Cardiomegaly.  No significant detrimental change      Electronically signed by: LASHA MCCALL MD  Date:     07/15/17  Time:    22:25              Narrative:    EXAM:   XR CHEST AP PORTABLE    CLINICAL HISTORY:  AMS      COMPARISON:  07/15/2017 1255 hrs.    FINDINGS:   Heart size enlarged.  Heart size is similar.  Aortic calcifications.  Mild central vascular prominence.  Lungs are well aerated and appear clear. No suspicious mass, infiltrate or effusion.                             CT Head Without Contrast (Final result)  Result time 07/15/17 22:24:03    Final result by Lasha Mccall Jr., MD (07/15/17 22:24:03)                 Impression:     No acute findings evident.  Chronic findings as above    All CT scans at this facility use dose modulation, iterative reconstruction, and/or weight based dosing when appropriate to reduce radiation dose to as low as reasonably achievable.        Electronically signed by: LASHA MCCALL MD  Date:     07/15/17  Time:    22:24              Narrative:    Exam: CT HEAD WITHOUT CONTRAST    History:  AMS      Technique: Noncontrast axial images of the head were obtained.    Comparison:None    Findings: Bilateral symmetric involutional changes.  Bilateral microvascular ischemic change.  Old tiny lacunar infarct in the anterior aspect of the left corona radiata.  Physiologic bilateral basal ganglia calcifications.  Ventricular system is normal.  No hydrocephalus.  No midline shift.  No abnormal density to indicate acute major vascular distribution ischemic infarction or hemorrhage.  No mass effect.  No extra-axial fluid collections.     Visualized paranasal sinuses and mastoid air cells appear clear.      No  calvarial fracture.                             The EKG was ordered, reviewed, and independently interpreted by the ED provider.  Interpretation time: 2139  Rate: 48 BPM  Rhythm: Junctional rhythm  Interpretation: L axis deviation. Incomplete L bundle branch block. T wave abnormality, consider lateral ischemia. Abnormal ECG. No STEMI.    EKG from 0102 demonstrates an accelerated junctional rhythm with a rate of 81.  There is evidence of septal infarction with Q waves in V1 and V2.  I still see no evidence of acute ST elevation.       The Emergency Provider reviewed the vital signs and test results, which are outlined above.    ED Discussion     9:42 PM: Pt given narcan. Pt heart rate and BP immediately improved and pt GCS went up to 14 within seconds. Pt confirms she took pain medication today when asked by the physician. Pt has not stated what she took.      ED Medication(s):  Medications   cefepime in dextrose 5 % 1 gram/50 mL IVPB 1 g (1 g Intravenous New Bag 7/16/17 0051)   vancomycin 1 g in dextrose 5 % 250 mL IVPB (ready to mix system) (750 mg Intravenous New Bag 7/16/17 0014)   norepinephrine 4 mg in dextrose 5% 250 mL infusion (premix) (titrating) (0.1 mcg/kg/min × 45.4 kg Intravenous New Bag 7/16/17 0110)   naloxone injection (2 mg Injection Given 7/15/17 2141)   glucagon (human recombinant) injection 3 mg (3 mg Intravenous Given 7/15/17 2201)   sodium chloride 0.9% bolus 1,362 mL (0 mLs Intravenous Stopped 7/15/17 2351)   cefepime in dextrose 5 % 1 gram/50 mL IVPB 1 g (0 g Intravenous Stopped 7/16/17 0043)   sodium bicarbonate solution 100 mEq (100 mEq Intravenous Given 7/16/17 0105)       New Prescriptions    No medications on file             Medical Decision Making    Medical Decision Making:   Clinical Tests:   Lab Tests: Reviewed and Ordered  Radiological Study: Reviewed and Ordered  Medical Tests: Reviewed and Ordered  ED Management:  Patient remains incredibly labile.  On 2 separate occasions the  patient has lost her pulse due to asystole despite transcutaneous pacing.  Patient had been administered glucagon secondary to concerns for possible beta blocker excess though there is no history to support this.  Patient had no apparent response.  Although the patient initially had a transient response to Narcan, her UDS failed to demonstrate opiates in the urine.  Patient has responded to epinephrine and atropine with restoration of spontaneous circulation.  We have the patient on demand transcutaneous pacing as her HR is frequently dipping down into the 30s and 40s with corresponding drops in BP.  She is profoundly acidotic and we have elected to give her 4 A of sodium bicarbonate as well as calcium.  She appears to be ventilating and oxygenating well and is unsuccessfully attempting to compensate for what appears to be a profound lactic acidosis.  Although we have been unable to obtain confirmation of this, I suspect that the patient has mesenteric ischemia secondary to bowel obstruction given the substantial nasogastric output, abdominal distention, and the abdominal films performed earlier today.  We have slowed the patient's IV fluids after 500 cc of saline dueto significant jugular venous distention and apparent cardiogenic failure.  We are attempting to improve cardiac function and vascular tone at this point with use of a Levophed infusion in the hopes of improving perfusion.  Given her overall status however in the face of significant comorbidities, the patient's prognosis is dismal.  I have counseled the family at leave on multiple occasions that I think the patient is unlikely to survive the current insult.  I feel that we have provided maximal care for at this facility and at this point believe it is in her best interest to move to a facility for sustaining a critical care, further investigation, and possible surgical consultation should she stabilize.  Patient has received empiric antibiotics though  we have not definitively identified an infectious source.    All historical, clinical, radiographic, and laboratory findings were reviewed with the patient/family in detail along with the indications for transfer to an outside facility (rather than admission to our facility in [place]) secondary to patient and family preference for continuity of care and a need for  critical care services as well as possible surgical consultation given the diagnosis of post cardiac arrest, symptomatic bradycardia requiring transcutaneous pacing, severe uncompensated metabolic acidosis, and probable small bowel obstruction with mesenteric ischemia though this source is yet to be confirmed.  All remaining questions and concerns were addressed at that time and the patient/family communicates understanding and agrees to proceed accordingly.  Similarly all pertinent details of the encounter were discussed with Dr Pantoja at [facility] ED via [facility] who agrees to accept the patient in transfer based on the needs/patient preferences outlined above.  [facility] secondary to a need for ongoing cardiac monitoring, vasopressors, mechanical ventilation, and IV fluids en route.  Jose Roberto Up MD  1:32 AM             Scribe Attestation:   Scribe #1: I performed the above scribed service and the documentation accurately describes the services I performed. I attest to the accuracy of the note.    Attending:   Physician Attestation Statement for Scribe #1: I, Jose Roberto Up MD, personally performed the services described in this documentation, as scribed by Corinne Mack, in my presence, and it is both accurate and complete.          Clinical Impression       ICD-10-CM ICD-9-CM   1. Junctional rhythm I49.8 427.89   2. Altered mental status R41.82 780.97   3. Lactic acid acidosis E87.2 276.2   4. Leukopenia, unspecified type D72.819 288.50   5. Chronic renal insufficiency, unspecified stage N18.9  585.9   6. Kidney transplant status Z94.0 V42.0   7. Anemia, unspecified type D64.9 285.9   8. Type 2 diabetes mellitus with hyperglycemia, without long-term current use of insulin E11.65 250.00     790.29   9. Respiratory failure J96.90 518.81   10. Small bowel obstruction K56.69 560.9   11. Cardiac arrest I46.9 427.5               Jose Roberto Up MD  07/16/17 0140       Jose Roberto Up MD  07/22/17 0141

## 2017-07-16 NOTE — ED NOTES
Pt intubated at this time by Dr. Up. 7.5tube secured 20cm at the gums.     Vent settings:  Tidal volume: 500  Rate: 15  Peep: 5

## 2017-07-21 LAB
BACTERIA BLD CULT: NORMAL
BACTERIA BLD CULT: NORMAL
